# Patient Record
Sex: FEMALE | Race: WHITE | Employment: OTHER | URBAN - METROPOLITAN AREA
[De-identification: names, ages, dates, MRNs, and addresses within clinical notes are randomized per-mention and may not be internally consistent; named-entity substitution may affect disease eponyms.]

---

## 2017-01-23 ENCOUNTER — TELEPHONE (OUTPATIENT)
Dept: FAMILY MEDICINE CLINIC | Facility: CLINIC | Age: 67
End: 2017-01-23

## 2017-01-23 ENCOUNTER — NURSE ONLY (OUTPATIENT)
Dept: FAMILY MEDICINE CLINIC | Facility: CLINIC | Age: 67
End: 2017-01-23

## 2017-01-23 DIAGNOSIS — R30.0 DYSURIA: Primary | ICD-10-CM

## 2017-01-23 LAB
BILIRUBIN: NEGATIVE
GLUCOSE (URINE DIPSTICK): NEGATIVE MG/DL
KETONES (URINE DIPSTICK): NEGATIVE MG/DL
MULTISTIX LOT#: NORMAL NUMERIC
NITRITE, URINE: NEGATIVE
PH, URINE: 5 (ref 4.5–8)
PROTEIN (URINE DIPSTICK): NEGATIVE MG/DL
SPECIFIC GRAVITY: 1.02 (ref 1–1.03)
URINE-COLOR: YELLOW
UROBILINOGEN,SEMI-QN: 0.2 MG/DL (ref 0–1.9)

## 2017-01-23 PROCEDURE — 87077 CULTURE AEROBIC IDENTIFY: CPT | Performed by: FAMILY MEDICINE

## 2017-01-23 PROCEDURE — 87186 SC STD MICRODIL/AGAR DIL: CPT | Performed by: FAMILY MEDICINE

## 2017-01-23 PROCEDURE — 81003 URINALYSIS AUTO W/O SCOPE: CPT | Performed by: FAMILY MEDICINE

## 2017-01-23 PROCEDURE — 87086 URINE CULTURE/COLONY COUNT: CPT | Performed by: FAMILY MEDICINE

## 2017-01-23 RX ORDER — SULFAMETHOXAZOLE AND TRIMETHOPRIM 800; 160 MG/1; MG/1
1 TABLET ORAL 2 TIMES DAILY
Qty: 10 TABLET | Refills: 0 | Status: SHIPPED | OUTPATIENT
Start: 2017-01-23 | End: 2017-01-28

## 2017-01-23 NOTE — TELEPHONE ENCOUNTER
Spoke with the pt and she is having burning at the end of emptying her bladder  Yesterday she felt that she had to go often but not today  I asked her if she could come to the office and give a urine sample and she is agreeable-  Ok per DS    Appt schedule

## 2017-01-23 NOTE — PROGRESS NOTES
Urine dip suspicious- per Dr. Mami Stanley send for culture and start bacrtim DS 1 po bid x5 days    Pt advised  And she v/u    Updated allergy list

## 2017-01-26 ENCOUNTER — TELEPHONE (OUTPATIENT)
Dept: FAMILY MEDICINE CLINIC | Facility: CLINIC | Age: 67
End: 2017-01-26

## 2017-01-26 NOTE — TELEPHONE ENCOUNTER
Detailed message left for pt on cell (ok per consent) with instructions to call with questions/concerns.

## 2017-01-26 NOTE — TELEPHONE ENCOUNTER
----- Message from Mirian Gilbert DO sent at 1/26/2017  8:06 AM CST -----  Can notify Koko Tubbs her urine grew out  Bacteria sensitive to the ABX she is on, but its not the most sensitive.  If she finshes it and is still having Sx she needs to call we may need t

## 2017-03-21 ENCOUNTER — PATIENT OUTREACH (OUTPATIENT)
Dept: FAMILY MEDICINE CLINIC | Facility: CLINIC | Age: 67
End: 2017-03-21

## 2017-04-01 RX ORDER — BUDESONIDE AND FORMOTEROL FUMARATE DIHYDRATE 160; 4.5 UG/1; UG/1
AEROSOL RESPIRATORY (INHALATION)
Qty: 1 INHALER | Refills: 11 | Status: SHIPPED | OUTPATIENT
Start: 2017-04-01 | End: 2018-04-21

## 2017-04-01 NOTE — TELEPHONE ENCOUNTER
Symbicort:  Last refill: 1-4-2016 #1 inhaler with 11 refills  Last Visit: 9-    Ventolin:  Last refill:1-4-2016 #1 inhaler with 1 refill  Last visit: 9-    Next Visit: Future Appointments  Date Time Provider Jerald Lomeli   9/26/2017 1:00

## 2017-04-18 ENCOUNTER — OFFICE VISIT (OUTPATIENT)
Dept: FAMILY MEDICINE CLINIC | Facility: CLINIC | Age: 67
End: 2017-04-18

## 2017-04-18 VITALS
OXYGEN SATURATION: 98 % | BODY MASS INDEX: 30 KG/M2 | WEIGHT: 186.19 LBS | RESPIRATION RATE: 20 BRPM | HEART RATE: 68 BPM | TEMPERATURE: 98 F | SYSTOLIC BLOOD PRESSURE: 128 MMHG | DIASTOLIC BLOOD PRESSURE: 90 MMHG

## 2017-04-18 DIAGNOSIS — J20.9 BRONCHITIS WITH BRONCHOSPASM: Primary | ICD-10-CM

## 2017-04-18 PROCEDURE — 99214 OFFICE O/P EST MOD 30 MIN: CPT | Performed by: FAMILY MEDICINE

## 2017-04-18 RX ORDER — AZITHROMYCIN 250 MG/1
TABLET, FILM COATED ORAL
Qty: 6 TABLET | Refills: 0 | Status: SHIPPED | OUTPATIENT
Start: 2017-04-18 | End: 2017-09-26 | Stop reason: ALTCHOICE

## 2017-04-18 RX ORDER — PREDNISONE 20 MG/1
40 TABLET ORAL DAILY
Qty: 10 TABLET | Refills: 0 | Status: SHIPPED | OUTPATIENT
Start: 2017-04-18 | End: 2017-04-23

## 2017-04-18 NOTE — PROGRESS NOTES
Roshni Quinteros is a 79year old female. Patient presents with:  Chest Congestion: chest congestions for 2 weeks per pt    HPI:   Bonny Dakins presents to the office with complaints of upper respiratory tract infection, having congestion for 17 days.  Started wit MG Oral Cap Take 220 mg by mouth 2 (two) times daily as needed.  Disp:  Rfl:         Doxycycline             Nausea and vomiting  Penicillins             Diarrhea   Past Medical History   Diagnosis Date   • COPD (chronic obstructive pulmonary disease) with or worsen. Bronchitis with bronchospasm  (primary encounter diagnosis)    No orders of the defined types were placed in this encounter.        Meds & Refills for this Visit:  Signed Prescriptions Disp Refills    azithromycin 250 MG Oral Tab 6 tablet 0

## 2017-09-26 ENCOUNTER — OFFICE VISIT (OUTPATIENT)
Dept: FAMILY MEDICINE CLINIC | Facility: CLINIC | Age: 67
End: 2017-09-26

## 2017-09-26 VITALS
HEART RATE: 80 BPM | TEMPERATURE: 98 F | SYSTOLIC BLOOD PRESSURE: 140 MMHG | WEIGHT: 181.63 LBS | BODY MASS INDEX: 29.9 KG/M2 | RESPIRATION RATE: 14 BRPM | HEIGHT: 65.5 IN | DIASTOLIC BLOOD PRESSURE: 90 MMHG

## 2017-09-26 DIAGNOSIS — Z12.31 VISIT FOR SCREENING MAMMOGRAM: ICD-10-CM

## 2017-09-26 DIAGNOSIS — S92.332D CLOSED DISPLACED FRACTURE OF THIRD METATARSAL BONE OF LEFT FOOT WITH ROUTINE HEALING, SUBSEQUENT ENCOUNTER: ICD-10-CM

## 2017-09-26 DIAGNOSIS — Z13.31 DEPRESSION SCREENING: ICD-10-CM

## 2017-09-26 DIAGNOSIS — Z00.00 ENCOUNTER FOR ANNUAL HEALTH EXAMINATION: Primary | ICD-10-CM

## 2017-09-26 DIAGNOSIS — E55.9 VITAMIN D DEFICIENCY: ICD-10-CM

## 2017-09-26 DIAGNOSIS — Z12.11 COLON CANCER SCREENING: ICD-10-CM

## 2017-09-26 DIAGNOSIS — Z13.6 SCREENING FOR CARDIOVASCULAR CONDITION: ICD-10-CM

## 2017-09-26 DIAGNOSIS — J41.0 SIMPLE CHRONIC BRONCHITIS (HCC): ICD-10-CM

## 2017-09-26 DIAGNOSIS — K63.5 POLYP OF COLON, UNSPECIFIED PART OF COLON, UNSPECIFIED TYPE: ICD-10-CM

## 2017-09-26 DIAGNOSIS — Z11.59 NEED FOR HEPATITIS C SCREENING TEST: ICD-10-CM

## 2017-09-26 DIAGNOSIS — Z78.0 POSTMENOPAUSAL: ICD-10-CM

## 2017-09-26 PROCEDURE — 86803 HEPATITIS C AB TEST: CPT | Performed by: FAMILY MEDICINE

## 2017-09-26 PROCEDURE — 82306 VITAMIN D 25 HYDROXY: CPT | Performed by: FAMILY MEDICINE

## 2017-09-26 PROCEDURE — G0439 PPPS, SUBSEQ VISIT: HCPCS | Performed by: FAMILY MEDICINE

## 2017-09-26 PROCEDURE — G0444 DEPRESSION SCREEN ANNUAL: HCPCS | Performed by: FAMILY MEDICINE

## 2017-09-26 PROCEDURE — 85025 COMPLETE CBC W/AUTO DIFF WBC: CPT | Performed by: FAMILY MEDICINE

## 2017-09-26 PROCEDURE — 80053 COMPREHEN METABOLIC PANEL: CPT | Performed by: FAMILY MEDICINE

## 2017-09-26 PROCEDURE — 80061 LIPID PANEL: CPT | Performed by: FAMILY MEDICINE

## 2017-09-26 NOTE — PATIENT INSTRUCTIONS
Recommended Websites for Advanced Directives    [employer].no. org/publications/Documents/personal_dec. pdf  An information packet, including necessary form from the "[employer] website. http://www. [place]state. [place]/public/books/adv

## 2017-09-26 NOTE — PROGRESS NOTES
HPI:   Kathryn Reyes is a 79year old female who presents for a Medicare Subsequent Annual Wellness visit (Pt already had Initial Annual Wellness). Feeling pretty great overall.      Seeing Dr. Leona Correia podiatrist for a left 3rd metatarsa MG/3ML Inhalation Solution Take 3 mL by nebulization every 4 (four) hours as needed. Fexofenadine HCl (ALLEGRA) 180 MG Oral Tab Take 180 mg by mouth daily. Calcium Citrate-Vitamin D (CITRACAL + D OR) Take 1 tablet by mouth daily.    Cholecalciferol (VIT 181 lb 9.6 oz.     Medicare Hearing Assessment  (Required for AWV/SWV)    Hearing Screening    Time taken:  9/26/2017  1:04 PM  Screening Method:  Whisper Test  Whisper Test Result:  Pass          Visual Acuity  Right Eye Visual Acuity: Corrected Right Eye (primary encounter diagnosis)  --declines immunizations; encouraged her to reconsider; in addition urged her to f/u with GI--she is 6 yrs past the recommended repeat scope    Visit for screening mammogram  -gave order  Postmenopausal  -gave order for dexa Appropriate Exercise    How would you describe your daily physical activity?: Moderate    How would you describe your current health state?: Good    How do you maintain positive mental well-being?: Social Interaction;Puzzles;Games; Visiting Friends; Visiting Charting, test patient and document. Then, refresh your progress note to see your input here.   Cognitive Assessment     What day of the week is this?: Correct    What month is it?: Correct    What year is it?: Correct    Recall \"Ball\": Correct    Reca applicable     Immunizations (Update Immunization Activity if applicable)     Influenza  Covered Annually  Please get every year    Pneumococcal 13 (Prevnar)  Covered Once after 65 No vaccine history found Please get once after your 65th birthday    Simin Paul Spirometry Testing Annually Spirometry date:  No flowsheet data found.          Template: [facility] AMB MEDICARE ANNUAL ASSESSMENT FEMALE [[id number]]

## 2017-09-27 LAB
25-HYDROXYVITAMIN D (TOTAL): 35.9 NG/ML (ref 30–100)
ALBUMIN SERPL-MCNC: 3.7 G/DL (ref 3.5–4.8)
ALP LIVER SERPL-CCNC: 54 U/L (ref 55–142)
ALT SERPL-CCNC: 30 U/L (ref 14–54)
AST SERPL-CCNC: 20 U/L (ref 15–41)
BASOPHILS # BLD AUTO: 0.06 X10(3) UL (ref 0–0.1)
BASOPHILS NFR BLD AUTO: 0.8 %
BILIRUB SERPL-MCNC: 0.3 MG/DL (ref 0.1–2)
BUN BLD-MCNC: 17 MG/DL (ref 8–20)
CALCIUM BLD-MCNC: 9.2 MG/DL (ref 8.3–10.3)
CHLORIDE: 104 MMOL/L (ref 101–111)
CHOLEST SMN-MCNC: 211 MG/DL (ref ?–200)
CO2: 27 MMOL/L (ref 22–32)
CREAT BLD-MCNC: 0.79 MG/DL (ref 0.55–1.02)
EOSINOPHIL # BLD AUTO: 0.29 X10(3) UL (ref 0–0.3)
EOSINOPHIL NFR BLD AUTO: 3.6 %
ERYTHROCYTE [DISTWIDTH] IN BLOOD BY AUTOMATED COUNT: 13 % (ref 11.5–16)
GLUCOSE BLD-MCNC: 81 MG/DL (ref 70–99)
HCT VFR BLD AUTO: 41 % (ref 34–50)
HDLC SERPL-MCNC: 84 MG/DL (ref 45–?)
HDLC SERPL: 2.51 {RATIO} (ref ?–4.44)
HEPATITIS C VIRUS AB INTERPRETATION: NONREACTIVE
HGB BLD-MCNC: 13.9 G/DL (ref 12–16)
IMMATURE GRANULOCYTE COUNT: 0.02 X10(3) UL (ref 0–1)
IMMATURE GRANULOCYTE RATIO %: 0.3 %
LDLC SERPL CALC-MCNC: 115 MG/DL (ref ?–130)
LDLC SERPL-MCNC: 12 MG/DL (ref 5–40)
LYMPHOCYTES # BLD AUTO: 1.29 X10(3) UL (ref 0.9–4)
LYMPHOCYTES NFR BLD AUTO: 16.2 %
M PROTEIN MFR SERPL ELPH: 7 G/DL (ref 6.1–8.3)
MCH RBC QN AUTO: 32.1 PG (ref 27–33.2)
MCHC RBC AUTO-ENTMCNC: 33.9 G/DL (ref 31–37)
MCV RBC AUTO: 94.7 FL (ref 81–100)
MONOCYTES # BLD AUTO: 0.67 X10(3) UL (ref 0.1–0.6)
MONOCYTES NFR BLD AUTO: 8.4 %
NEUTROPHIL ABS PRELIM: 5.65 X10 (3) UL (ref 1.3–6.7)
NEUTROPHILS # BLD AUTO: 5.65 X10(3) UL (ref 1.3–6.7)
NEUTROPHILS NFR BLD AUTO: 70.7 %
NONHDLC SERPL-MCNC: 127 MG/DL (ref ?–130)
PLATELET # BLD AUTO: 293 10(3)UL (ref 150–450)
POTASSIUM SERPL-SCNC: 4 MMOL/L (ref 3.6–5.1)
RBC # BLD AUTO: 4.33 X10(6)UL (ref 3.8–5.1)
RED CELL DISTRIBUTION WIDTH-SD: 45.1 FL (ref 35.1–46.3)
SODIUM SERPL-SCNC: 139 MMOL/L (ref 136–144)
TRIGLYCERIDES: 62 MG/DL (ref ?–150)
WBC # BLD AUTO: 8 X10(3) UL (ref 4–13)

## 2017-10-10 ENCOUNTER — TELEPHONE (OUTPATIENT)
Dept: FAMILY MEDICINE CLINIC | Facility: CLINIC | Age: 67
End: 2017-10-10

## 2017-10-27 PROBLEM — M85.89 OSTEOPENIA OF MULTIPLE SITES: Status: ACTIVE | Noted: 2017-10-27

## 2017-11-06 ENCOUNTER — MED REC SCAN ONLY (OUTPATIENT)
Dept: FAMILY MEDICINE CLINIC | Facility: CLINIC | Age: 67
End: 2017-11-06

## 2017-11-13 RX ORDER — IPRATROPIUM BROMIDE AND ALBUTEROL SULFATE 2.5; .5 MG/3ML; MG/3ML
SOLUTION RESPIRATORY (INHALATION)
Qty: 270 ML | Refills: 0 | Status: SHIPPED | OUTPATIENT
Start: 2017-11-13 | End: 2019-03-01

## 2017-12-06 ENCOUNTER — TELEPHONE (OUTPATIENT)
Dept: FAMILY MEDICINE CLINIC | Facility: CLINIC | Age: 67
End: 2017-12-06

## 2017-12-06 ENCOUNTER — HOSPITAL ENCOUNTER (OUTPATIENT)
Dept: GENERAL RADIOLOGY | Age: 67
Discharge: HOME | End: 2017-12-06
Attending: FAMILY MEDICINE
Payer: MEDICARE

## 2017-12-06 ENCOUNTER — OFFICE VISIT (OUTPATIENT)
Dept: FAMILY MEDICINE CLINIC | Facility: CLINIC | Age: 67
End: 2017-12-06

## 2017-12-06 VITALS
HEART RATE: 82 BPM | TEMPERATURE: 98 F | WEIGHT: 181.81 LBS | BODY MASS INDEX: 30 KG/M2 | DIASTOLIC BLOOD PRESSURE: 90 MMHG | SYSTOLIC BLOOD PRESSURE: 140 MMHG | OXYGEN SATURATION: 99 %

## 2017-12-06 DIAGNOSIS — R06.00 DOE (DYSPNEA ON EXERTION): Primary | ICD-10-CM

## 2017-12-06 DIAGNOSIS — J41.0 SIMPLE CHRONIC BRONCHITIS (HCC): ICD-10-CM

## 2017-12-06 DIAGNOSIS — R94.31 ABNORMAL EKG: ICD-10-CM

## 2017-12-06 DIAGNOSIS — R06.00 DOE (DYSPNEA ON EXERTION): ICD-10-CM

## 2017-12-06 DIAGNOSIS — R03.0 ELEVATED BLOOD PRESSURE READING: ICD-10-CM

## 2017-12-06 PROCEDURE — 99214 OFFICE O/P EST MOD 30 MIN: CPT | Performed by: FAMILY MEDICINE

## 2017-12-06 PROCEDURE — 71020 XR CHEST PA + LAT CHEST (CPT=71020): CPT | Performed by: FAMILY MEDICINE

## 2017-12-06 PROCEDURE — 93000 ELECTROCARDIOGRAM COMPLETE: CPT | Performed by: FAMILY MEDICINE

## 2017-12-06 RX ORDER — HYDROCHLOROTHIAZIDE 12.5 MG/1
12.5 TABLET ORAL DAILY
Qty: 30 TABLET | Refills: 0 | Status: SHIPPED | OUTPATIENT
Start: 2017-12-06 | End: 2017-12-20

## 2017-12-06 NOTE — PROGRESS NOTES
Adeola Bhandari is a 79year old female. Patient presents with:  Breathing Problem    HPI:   Breann Otto presents to the office with complaints of upper respiratory tract infection, having congestion for 1-2 weeks.   She has had a cough and minimal sputum produc HCl (ALLEGRA) 180 MG Oral Tab Take 180 mg by mouth daily. Disp:  Rfl:    Calcium Citrate-Vitamin D (CITRACAL + D OR) Take 1 tablet by mouth daily. Disp:  Rfl:    Cholecalciferol (VITAMIN D) 1000 UNITS Oral Cap Take 5,000 Units by mouth.    Disp:  Rfl:    Na edema    ASSESSMENT AND PLAN:   Jona Beltran is a 79year old female who presents with chronic bronchitis, PERALTA. Monetta Elbert PLAN: start spiriva daily. use flonase daily also. The patient indicates understanding of these issues and agrees to the plan.   The pa

## 2017-12-06 NOTE — TELEPHONE ENCOUNTER
Pt would like to be seen today, She is starting to feel some chest congestion and SOB with activity.    Please return call to [phone]

## 2017-12-07 ENCOUNTER — TELEPHONE (OUTPATIENT)
Dept: FAMILY MEDICINE CLINIC | Facility: CLINIC | Age: 67
End: 2017-12-07

## 2017-12-07 RX ORDER — AZITHROMYCIN 250 MG/1
TABLET, FILM COATED ORAL
Qty: 6 TABLET | Refills: 0 | Status: SHIPPED | OUTPATIENT
Start: 2017-12-07 | End: 2017-12-20 | Stop reason: ALTCHOICE

## 2017-12-07 NOTE — TELEPHONE ENCOUNTER
She can hold off for now, follow up with Dr. Kendell Ewing in 2 weeks as scheduled. I'm glad the inhaler is working.

## 2017-12-07 NOTE — TELEPHONE ENCOUNTER
Pt states that inhaler that you started her on really  Helped- wants to know if she still needs to have stress test?    Future Appointments  Date Time Provider Jerald Lomeli   12/20/2017 10:00 AM Charlie Rutledge MD [facility]

## 2017-12-07 NOTE — TELEPHONE ENCOUNTER
Pt advised.     Future Appointments  Date Time Provider Jerald Lomeli   12/20/2017 10:00 AM Kemar Dumont MD [address], 12/07/17, 1:41 PM

## 2017-12-07 NOTE — TELEPHONE ENCOUNTER
----- Message from Kenton Kulkarni DO sent at 12/7/2017 10:04 AM CST -----  I still would like her to continue with our plan from yesterday, follow up in 2 weeks, stress test ordered.

## 2017-12-20 ENCOUNTER — OFFICE VISIT (OUTPATIENT)
Dept: FAMILY MEDICINE CLINIC | Facility: CLINIC | Age: 67
End: 2017-12-20

## 2017-12-20 VITALS
DIASTOLIC BLOOD PRESSURE: 70 MMHG | TEMPERATURE: 98 F | HEART RATE: 64 BPM | SYSTOLIC BLOOD PRESSURE: 110 MMHG | WEIGHT: 180 LBS | BODY MASS INDEX: 29.63 KG/M2 | RESPIRATION RATE: 16 BRPM | HEIGHT: 65.5 IN

## 2017-12-20 DIAGNOSIS — R93.89 ABNORMAL CXR: Primary | ICD-10-CM

## 2017-12-20 DIAGNOSIS — J41.0 SIMPLE CHRONIC BRONCHITIS (HCC): ICD-10-CM

## 2017-12-20 DIAGNOSIS — R49.0 HOARSE VOICE QUALITY: ICD-10-CM

## 2017-12-20 DIAGNOSIS — I10 ESSENTIAL HYPERTENSION: ICD-10-CM

## 2017-12-20 PROCEDURE — 99214 OFFICE O/P EST MOD 30 MIN: CPT | Performed by: FAMILY MEDICINE

## 2017-12-20 RX ORDER — PANTOPRAZOLE SODIUM 40 MG/1
40 TABLET, DELAYED RELEASE ORAL
Qty: 30 TABLET | Refills: 0 | Status: SHIPPED | OUTPATIENT
Start: 2017-12-20 | End: 2017-12-20 | Stop reason: CLARIF

## 2017-12-20 RX ORDER — HYDROCHLOROTHIAZIDE 12.5 MG/1
12.5 TABLET ORAL DAILY
Qty: 30 TABLET | Refills: 0 | Status: SHIPPED | OUTPATIENT
Start: 2017-12-20 | End: 2017-12-20 | Stop reason: CLARIF

## 2017-12-20 RX ORDER — HYDROCHLOROTHIAZIDE 12.5 MG/1
12.5 TABLET ORAL DAILY
Qty: 30 TABLET | Refills: 11 | Status: SHIPPED | OUTPATIENT
Start: 2017-12-20 | End: 2018-12-09

## 2017-12-20 RX ORDER — PANTOPRAZOLE SODIUM 40 MG/1
40 TABLET, DELAYED RELEASE ORAL EVERY EVENING
Qty: 30 TABLET | Refills: 0 | Status: SHIPPED | OUTPATIENT
Start: 2017-12-20 | End: 2018-01-15

## 2017-12-20 NOTE — PROGRESS NOTES
Caleb Cheek is a 79year old female.   HPI:   PT is here to f/u on her recent visit with Dr. Keesha Lutz in which she was diagnosed with chronic bronchitis and put on spiriva, had abnromal CXR with likely pneumonia and an abnormal EKG for which a stress cheryl mouth.   Disp:  Rfl:    Naproxen Sodium (ALEVE) 220 MG Oral Cap Take 220 mg by mouth 2 (two) times daily as needed. Disp:  Rfl:    VENTOLIN  (90 Base) MCG/ACT Inhalation Aero Soln INHALE 2 PUFFS INTO THE LUNGS EVERY 4 (FOUR) HOURS AS NEEDED.  Disp: 1 cyanosis, clubbing or edema    ASSESSMENT AND PLAN:   Diagnoses and all orders for this visit:  HTN  -recent new diagnosis, tolerating HCTZ well and BP well controlled, CPM and f/u in 6 months  Abnormal CXR  -repeat CXR 4-6 weeks from first abnormal, make

## 2017-12-31 PROBLEM — I10 ESSENTIAL HYPERTENSION: Status: ACTIVE | Noted: 2017-12-31

## 2018-01-15 RX ORDER — PANTOPRAZOLE SODIUM 40 MG/1
TABLET, DELAYED RELEASE ORAL
Qty: 30 TABLET | Refills: 5 | Status: SHIPPED | OUTPATIENT
Start: 2018-01-15 | End: 2019-09-30

## 2018-01-15 NOTE — TELEPHONE ENCOUNTER
Last refilled on 12/20/17 for # 30 with 0 rf. Last seen on 12/20/17. Future Appointments  Date Time Provider Jerald Yani   1/22/2018 10:30 AM [facility] XR RM1 Corrine Young        Thank you.

## 2018-01-22 ENCOUNTER — HOSPITAL ENCOUNTER (OUTPATIENT)
Dept: GENERAL RADIOLOGY | Age: 68
Discharge: HOME | End: 2018-01-22
Attending: FAMILY MEDICINE
Payer: MEDICARE

## 2018-01-22 DIAGNOSIS — R93.89 ABNORMAL CXR: ICD-10-CM

## 2018-01-22 PROCEDURE — 71046 X-RAY EXAM CHEST 2 VIEWS: CPT | Performed by: FAMILY MEDICINE

## 2018-01-25 ENCOUNTER — TELEPHONE (OUTPATIENT)
Dept: FAMILY MEDICINE CLINIC | Facility: CLINIC | Age: 68
End: 2018-01-25

## 2018-01-25 NOTE — TELEPHONE ENCOUNTER
Written by Newman Aase, MD on 1/22/2018  9:53 PM   Good news--the chest xray is looking better than the prior, though there is still one small area I'm not sure what to make of.     1 area that looked like pneumonia previously is gone/clear.       Anothe

## 2018-01-25 NOTE — TELEPHONE ENCOUNTER
Spoke with patient who states she did not see the [employer] message from Dr Deon Morgan. Results given to patient as written by Dr Deon Morgan. Patient asked if message can be resent so she can review and consider options.   Patient will let office know what she decides

## 2018-02-05 ENCOUNTER — TELEPHONE (OUTPATIENT)
Dept: FAMILY MEDICINE CLINIC | Facility: CLINIC | Age: 68
End: 2018-02-05

## 2018-02-05 DIAGNOSIS — R91.1 LESION OF LEFT LUNG: Primary | ICD-10-CM

## 2018-02-05 NOTE — TELEPHONE ENCOUNTER
Pt called, she has decided to have a CT scan of left lung done as suggested by Dr. Don Paz.   Please call pt at [phone] to discuss

## 2018-02-05 NOTE — TELEPHONE ENCOUNTER
Called the pt and advised that the order has been placed and gave her the number to central scheduling- she v/u

## 2018-02-10 ENCOUNTER — HOSPITAL ENCOUNTER (OUTPATIENT)
Dept: CT IMAGING | Facility: HOSPITAL | Age: 68
Discharge: HOME | End: 2018-02-10
Attending: FAMILY MEDICINE
Payer: MEDICARE

## 2018-02-10 DIAGNOSIS — R91.1 LESION OF LEFT LUNG: ICD-10-CM

## 2018-02-10 PROCEDURE — 71250 CT THORAX DX C-: CPT | Performed by: FAMILY MEDICINE

## 2018-02-13 ENCOUNTER — TELEPHONE (OUTPATIENT)
Dept: FAMILY MEDICINE CLINIC | Facility: CLINIC | Age: 68
End: 2018-02-13

## 2018-02-13 NOTE — TELEPHONE ENCOUNTER
Called the pt and advised of the test results and the recommendations.   She wants to know what we are classifying the nodule as - I advised that the radiologist just noted it as a nodule- advised that it could just be a little bit of tissue- she wants to s

## 2018-02-13 NOTE — TELEPHONE ENCOUNTER
----- Message from Zohreh Escudero DO sent at 2/12/2018  8:09 AM CST -----  Can notify Kassidy Fret her CT showed, the area Dr. Dev Orozco was concerned about is an old area of scarring in the left lung base, likely due to previous old infection or inflammation.  But noth

## 2018-02-13 NOTE — TELEPHONE ENCOUNTER
Reviewed CT results with pt, reassured it is unlikely anything will come of these nodules, but with smoking hx we ought to repeat in 1 year, pt agreeable recall put in computer.     D/w pt the plaque build up should probably be further looked at with an UFH

## 2018-04-16 NOTE — TELEPHONE ENCOUNTER
Last refilled on 1/18/18 for # 18g with 0 refills    Last seen on 12/20/17  Future Appointments  Date Time Provider Jerald Lomeli   5/9/2018 2:45 PM PF CT RM1 PF [place]        Thank you.

## 2018-04-21 RX ORDER — BUDESONIDE AND FORMOTEROL FUMARATE DIHYDRATE 160; 4.5 UG/1; UG/1
AEROSOL RESPIRATORY (INHALATION)
Qty: 1 INHALER | Refills: 10 | Status: SHIPPED | OUTPATIENT
Start: 2018-04-21 | End: 2019-04-27

## 2018-04-21 NOTE — TELEPHONE ENCOUNTER
LRF  4/1/17 #1 with 11 refills  LOV  12/20/17 Future Appointments  Date Time Provider Jerald Lomeli   5/9/2018 2:45 PM PF CT RM1 PF CT [place]

## 2018-05-09 ENCOUNTER — HOSPITAL ENCOUNTER (OUTPATIENT)
Dept: CT IMAGING | Age: 68
Discharge: HOME | End: 2018-05-09
Attending: FAMILY MEDICINE

## 2018-05-09 DIAGNOSIS — Z13.9 SPECIAL SCREENING: ICD-10-CM

## 2018-05-11 ENCOUNTER — TELEPHONE (OUTPATIENT)
Dept: FAMILY MEDICINE CLINIC | Facility: CLINIC | Age: 68
End: 2018-05-11

## 2018-05-11 NOTE — TELEPHONE ENCOUNTER
Pt called back and advised of the test resutls and the recommendations from Dr. Deon Morgan  She is agreeable  I gave her the phone number to [phone] [place] [employer] and 2 names of a couple doctors in the practice  Dr. Stacey Hathaway and Dr. John Clark    She states that the NP she saw

## 2018-05-11 NOTE — TELEPHONE ENCOUNTER
----- Message from Zohreh Condon MD sent at 5/11/2018  7:13 AM CDT -----  Laveda Anger news, calcium score shows minimal plaque build up, put her at overall low risk for significant heart dz.   The lungs show scaring, most likely d/t prior infections and/or due to

## 2018-06-13 ENCOUNTER — HOSPITAL ENCOUNTER (OUTPATIENT)
Dept: CARDIOLOGY CLINIC | Facility: HOSPITAL | Age: 68
Discharge: HOME | End: 2018-06-13
Attending: INTERNAL MEDICINE

## 2018-06-13 DIAGNOSIS — Z13.9 ENCOUNTER FOR SCREENING: ICD-10-CM

## 2018-06-18 ENCOUNTER — MED REC SCAN ONLY (OUTPATIENT)
Dept: FAMILY MEDICINE CLINIC | Facility: CLINIC | Age: 68
End: 2018-06-18

## 2018-06-25 ENCOUNTER — RT VISIT (OUTPATIENT)
Dept: RESPIRATORY THERAPY | Facility: HOSPITAL | Age: 68
End: 2018-06-25
Attending: INTERNAL MEDICINE
Payer: MEDICARE

## 2018-06-25 DIAGNOSIS — R06.00 DYSPNEA ON EXERTION: ICD-10-CM

## 2018-06-25 PROCEDURE — 94729 DIFFUSING CAPACITY: CPT

## 2018-06-25 PROCEDURE — 94060 EVALUATION OF WHEEZING: CPT

## 2018-06-25 PROCEDURE — 94726 PLETHYSMOGRAPHY LUNG VOLUMES: CPT

## 2018-06-25 NOTE — PROCEDURES
Spirometry and  flow volume loop are consistent with severeairway obstruction.   There was no change in spirometry after bronchodilator challenge   There is an  increase in the RV and TLC  suggesting  air trapping  and hyperinflation  due to  airway obstruc

## 2018-07-11 ENCOUNTER — MED REC SCAN ONLY (OUTPATIENT)
Dept: FAMILY MEDICINE CLINIC | Facility: CLINIC | Age: 68
End: 2018-07-11

## 2018-07-31 RX ORDER — ACYCLOVIR 50 MG/G
OINTMENT TOPICAL
Qty: 15 G | Refills: 0 | Status: SHIPPED | OUTPATIENT
Start: 2018-07-31 | End: 2019-07-11

## 2018-07-31 NOTE — TELEPHONE ENCOUNTER
LOV: 12/20/17   Last Refill: 9/21/16 #15g 1 RF    Future Appointments  Date Time Provider Jerald Lomeli   9/28/2018 1:00 PM Sheri Davis MD [facility] [place]

## 2018-09-28 ENCOUNTER — OFFICE VISIT (OUTPATIENT)
Dept: FAMILY MEDICINE CLINIC | Facility: CLINIC | Age: 68
End: 2018-09-28
Payer: MEDICARE

## 2018-09-28 VITALS
DIASTOLIC BLOOD PRESSURE: 82 MMHG | HEART RATE: 77 BPM | SYSTOLIC BLOOD PRESSURE: 124 MMHG | HEIGHT: 67 IN | WEIGHT: 180 LBS | RESPIRATION RATE: 16 BRPM | BODY MASS INDEX: 28.25 KG/M2 | TEMPERATURE: 98 F

## 2018-09-28 DIAGNOSIS — I10 ESSENTIAL HYPERTENSION: ICD-10-CM

## 2018-09-28 DIAGNOSIS — K21.9 GASTROESOPHAGEAL REFLUX DISEASE WITHOUT ESOPHAGITIS: ICD-10-CM

## 2018-09-28 DIAGNOSIS — J41.0 SIMPLE CHRONIC BRONCHITIS (HCC): ICD-10-CM

## 2018-09-28 DIAGNOSIS — Z13.6 ENCOUNTER FOR LIPID SCREENING FOR CARDIOVASCULAR DISEASE: ICD-10-CM

## 2018-09-28 DIAGNOSIS — E55.9 VITAMIN D DEFICIENCY: ICD-10-CM

## 2018-09-28 DIAGNOSIS — Z00.00 ENCOUNTER FOR ANNUAL HEALTH EXAMINATION: Primary | ICD-10-CM

## 2018-09-28 DIAGNOSIS — M85.89 OSTEOPENIA OF MULTIPLE SITES: ICD-10-CM

## 2018-09-28 DIAGNOSIS — Z13.220 ENCOUNTER FOR LIPID SCREENING FOR CARDIOVASCULAR DISEASE: ICD-10-CM

## 2018-09-28 PROCEDURE — 84443 ASSAY THYROID STIM HORMONE: CPT | Performed by: FAMILY MEDICINE

## 2018-09-28 PROCEDURE — 85025 COMPLETE CBC W/AUTO DIFF WBC: CPT | Performed by: FAMILY MEDICINE

## 2018-09-28 PROCEDURE — 36415 COLL VENOUS BLD VENIPUNCTURE: CPT | Performed by: FAMILY MEDICINE

## 2018-09-28 PROCEDURE — 80053 COMPREHEN METABOLIC PANEL: CPT | Performed by: FAMILY MEDICINE

## 2018-09-28 PROCEDURE — 80061 LIPID PANEL: CPT | Performed by: FAMILY MEDICINE

## 2018-09-28 PROCEDURE — 82306 VITAMIN D 25 HYDROXY: CPT | Performed by: FAMILY MEDICINE

## 2018-09-28 PROCEDURE — 81001 URINALYSIS AUTO W/SCOPE: CPT | Performed by: FAMILY MEDICINE

## 2018-09-28 PROCEDURE — G0439 PPPS, SUBSEQ VISIT: HCPCS | Performed by: FAMILY MEDICINE

## 2018-09-28 RX ORDER — UMECLIDINIUM 62.5 UG/1
1 AEROSOL, POWDER ORAL EVERY MORNING
Refills: 10 | COMMUNITY
Start: 2018-09-07 | End: 2019-06-28

## 2018-09-28 RX ORDER — ASCORBIC ACID 500 MG
1 TABLET ORAL EVERY MORNING
COMMUNITY

## 2018-09-28 RX ORDER — LATANOPROST 50 UG/ML
2 SOLUTION/ DROPS OPHTHALMIC NIGHTLY
Refills: 1 | COMMUNITY
Start: 2018-09-18

## 2018-09-28 NOTE — PATIENT INSTRUCTIONS
[facility]    [address]  [place], [address]    Or    [facility] (have multiple locations)  [address] [place], [address]  Phone: [phone]

## 2018-09-28 NOTE — PROGRESS NOTES
HPI:   Sarah Becker is a 76year old female who presents for a Medicare Subsequent Annual Wellness visit (Pt already had Initial Annual Wellness).     Patient  reminds me she saw pulmonologist and was diagnosed with \"only using 1/2 her lung function\ Tobacco Use      Smoking status: Former Smoker        Packs/day: 1.00        Years: 20.00        Pack years: 20        Types: Cigarettes        Quit date: 2013        Years since quittin.7      Smokeless tobacco: Never Used         CAGE Alcohol hydrochlorothiazide 12.5 MG Oral Tab Take 1 tablet (12.5 mg total) by mouth daily.    IPRATROPIUM-ALBUTEROL 0.5-2.5 (3) MG/3ML Inhalation Solution INHALE 1 VIAL VIA NEBULIZER EVERY 4 HOURS   Fexofenadine HCl (ALLEGRA) 180 MG Oral Tab Take 180 mg by mouth rhythm. Pulmonary/Chest: Effort normal and breath sounds normal.   Neurological: She is alert. Skin: Skin is warm. Psychiatric: She has a normal mood and affect.         Vaccination History     Immunization History   Administered Date(s) Administered patient indicates understanding of these issues and agrees to the plan. Reinforced healthy diet, lifestyle, and exercise. No Follow-up on file.      Marin Peck MD, 9/28/2018     General Health     In the past six months, have you lost more than 10 po Annually if high risk No results found for: CHLAMYDIA No flowsheet data found.     Screening Mammogram      Mammogram  Annually to 76, then as discussed Mammogram due on 10/27/2018 Update Health Maintenance if applicable     Immunizations (Update Immunizat

## 2018-12-07 NOTE — TELEPHONE ENCOUNTER
Last refilled on 7/22/18 for # 18g with 0 refills  Last OV 9/28/18  No future appointments. Thank you.

## 2018-12-10 RX ORDER — HYDROCHLOROTHIAZIDE 12.5 MG/1
TABLET ORAL
Qty: 30 TABLET | Refills: 11 | Status: SHIPPED | OUTPATIENT
Start: 2018-12-10 | End: 2019-12-27

## 2018-12-10 NOTE — TELEPHONE ENCOUNTER
Last refilled on 12/20/17 for # 30 with 11 refills  Last BUN 23, creatinine 1.00 on 9/28/18  Last OV 9/28/18, /82  No future appointments. Thank you.

## 2018-12-28 ENCOUNTER — OFFICE VISIT (OUTPATIENT)
Dept: FAMILY MEDICINE CLINIC | Facility: CLINIC | Age: 68
End: 2018-12-28
Payer: MEDICARE

## 2018-12-28 VITALS
WEIGHT: 177.13 LBS | OXYGEN SATURATION: 97 % | SYSTOLIC BLOOD PRESSURE: 128 MMHG | BODY MASS INDEX: 28 KG/M2 | DIASTOLIC BLOOD PRESSURE: 88 MMHG | HEART RATE: 101 BPM | TEMPERATURE: 98 F

## 2018-12-28 DIAGNOSIS — R05.9 COUGH: Primary | ICD-10-CM

## 2018-12-28 DIAGNOSIS — R09.81 SINUS CONGESTION: ICD-10-CM

## 2018-12-28 PROCEDURE — 99213 OFFICE O/P EST LOW 20 MIN: CPT | Performed by: FAMILY MEDICINE

## 2018-12-28 RX ORDER — LEVOFLOXACIN 500 MG/1
500 TABLET, FILM COATED ORAL DAILY
Qty: 7 TABLET | Refills: 0 | Status: SHIPPED | OUTPATIENT
Start: 2018-12-28 | End: 2019-01-04

## 2018-12-28 NOTE — PROGRESS NOTES
HPI:    Patient ID: Cindy Maxwell is a 76year old female. Patient presents with:  Cough  Sinus Problem      HPI  Patient is here for cough and sinus congestion for 3-4 days. States cough is productive of yellowish sputum.  States she has a h/o COPD, NEBULIZER EVERY 4 HOURS Disp: 270 mL Rfl: 0   Fexofenadine HCl (ALLEGRA) 180 MG Oral Tab Take 180 mg by mouth daily. Disp:  Rfl:    Calcium Citrate-Vitamin D (CITRACAL + D OR) Take 1 tablet by mouth daily.  Disp:  Rfl:    Cholecalciferol (VITAMIN D) 1000 UN Oropharynx is clear and moist. No oropharyngeal exudate, posterior oropharyngeal edema or posterior oropharyngeal erythema. Eyes: Right eye exhibits no discharge. Left eye exhibits no discharge. Neck: Normal range of motion.    Cardiovascular: Normal he

## 2019-02-01 ENCOUNTER — TELEPHONE (OUTPATIENT)
Dept: FAMILY MEDICINE CLINIC | Facility: CLINIC | Age: 69
End: 2019-02-01

## 2019-02-01 NOTE — TELEPHONE ENCOUNTER
Pt is due for recheck on her CT of chest for a follow up on lung nodules. Forward to Dr. Hailey Mims, can you please place order and we can call patient to let her know. Thanks.

## 2019-02-02 NOTE — TELEPHONE ENCOUNTER
Patient states that after she was first referred to pulm, she went for visit with Dr Rosmery Chapa. They did want her to do a 6 minute walking test, which she hasn't done yet.  She also states that they told her that someday she may need a lung transplant which s

## 2019-02-04 NOTE — TELEPHONE ENCOUNTER
Spoke with the pt and advised that Dr. Dana Gonzales would like the pulmonologist to make the decision on follow up for the CT scan- the pt v/u she states that she thought that Dr. Flavio Ivory had said something about follow up at some point

## 2019-03-02 NOTE — TELEPHONE ENCOUNTER
Last refill: 11- #270 with 0 refills  Last Visit: 9-  Next Visit: No future appointments. Forward to Dr. Edgar Du please advise on refills. Thanks.

## 2019-03-04 ENCOUNTER — HOSPITAL ENCOUNTER (OUTPATIENT)
Dept: GENERAL RADIOLOGY | Age: 69
Discharge: HOME | End: 2019-03-04
Attending: FAMILY MEDICINE
Payer: MEDICARE

## 2019-03-04 ENCOUNTER — OFFICE VISIT (OUTPATIENT)
Dept: FAMILY MEDICINE CLINIC | Facility: CLINIC | Age: 69
End: 2019-03-04
Payer: MEDICARE

## 2019-03-04 ENCOUNTER — TELEPHONE (OUTPATIENT)
Dept: FAMILY MEDICINE CLINIC | Facility: CLINIC | Age: 69
End: 2019-03-04

## 2019-03-04 ENCOUNTER — HOSPITAL ENCOUNTER (OUTPATIENT)
Dept: GENERAL RADIOLOGY | Age: 69
Discharge: HOME | End: 2019-03-04
Attending: INTERNAL MEDICINE
Payer: MEDICARE

## 2019-03-04 VITALS
HEIGHT: 65.5 IN | DIASTOLIC BLOOD PRESSURE: 84 MMHG | WEIGHT: 169 LBS | TEMPERATURE: 98 F | OXYGEN SATURATION: 98 % | BODY MASS INDEX: 27.82 KG/M2 | HEART RATE: 75 BPM | SYSTOLIC BLOOD PRESSURE: 122 MMHG

## 2019-03-04 DIAGNOSIS — R05.9 COUGH: ICD-10-CM

## 2019-03-04 DIAGNOSIS — R06.2 WHEEZING: ICD-10-CM

## 2019-03-04 DIAGNOSIS — R06.00 DYSPNEA ON EXERTION: ICD-10-CM

## 2019-03-04 DIAGNOSIS — J44.1 COPD EXACERBATION (HCC): ICD-10-CM

## 2019-03-04 DIAGNOSIS — J44.9 CHRONIC OBSTRUCTIVE PULMONARY DISEASE, UNSPECIFIED COPD TYPE (HCC): Primary | ICD-10-CM

## 2019-03-04 DIAGNOSIS — R05.9 COUGH: Primary | ICD-10-CM

## 2019-03-04 PROCEDURE — 99214 OFFICE O/P EST MOD 30 MIN: CPT | Performed by: FAMILY MEDICINE

## 2019-03-04 PROCEDURE — 71046 X-RAY EXAM CHEST 2 VIEWS: CPT | Performed by: INTERNAL MEDICINE

## 2019-03-04 PROCEDURE — 94640 AIRWAY INHALATION TREATMENT: CPT | Performed by: FAMILY MEDICINE

## 2019-03-04 RX ORDER — PREDNISONE 20 MG/1
TABLET ORAL
Qty: 11 TABLET | Refills: 0 | Status: SHIPPED | OUTPATIENT
Start: 2019-03-04 | End: 2019-09-30 | Stop reason: ALTCHOICE

## 2019-03-04 RX ORDER — CEFDINIR 300 MG/1
300 CAPSULE ORAL 2 TIMES DAILY
Qty: 14 CAPSULE | Refills: 0 | Status: SHIPPED | OUTPATIENT
Start: 2019-03-04 | End: 2019-03-11

## 2019-03-04 RX ORDER — IPRATROPIUM BROMIDE AND ALBUTEROL SULFATE 2.5; .5 MG/3ML; MG/3ML
SOLUTION RESPIRATORY (INHALATION)
Qty: 270 ML | Refills: 0 | Status: SHIPPED | OUTPATIENT
Start: 2019-03-04 | End: 2021-04-09

## 2019-03-04 RX ORDER — IPRATROPIUM BROMIDE AND ALBUTEROL SULFATE 2.5; .5 MG/3ML; MG/3ML
SOLUTION RESPIRATORY (INHALATION)
Qty: 270 ML | Refills: 0 | Status: SHIPPED | OUTPATIENT
Start: 2019-03-04 | End: 2019-03-04

## 2019-03-04 RX ORDER — IPRATROPIUM BROMIDE AND ALBUTEROL SULFATE 2.5; .5 MG/3ML; MG/3ML
3 SOLUTION RESPIRATORY (INHALATION) ONCE
Status: COMPLETED | OUTPATIENT
Start: 2019-03-04 | End: 2019-03-04

## 2019-03-04 RX ADMIN — IPRATROPIUM BROMIDE AND ALBUTEROL SULFATE 3 ML: 2.5; .5 SOLUTION RESPIRATORY (INHALATION) at 16:52:00

## 2019-03-04 NOTE — TELEPHONE ENCOUNTER
Pt called, wanted to be seen today. Congestion and crackling sound in her throat. Please call pt at [phone] to discuss. Should she go to [facility] or Urgent care? Nothing available tomorrow either.

## 2019-03-04 NOTE — TELEPHONE ENCOUNTER
Patient states feels like there is a puddle in her throat. Some production when she coughs--just starting to get color to it. Fevers, chills, night sweats? No  Denies sore throat. Does not have tonsils. Does have SOB. Starting to get hoarse today.   Isa Morton

## 2019-03-04 NOTE — TELEPHONE ENCOUNTER
Patient accepted the appointment time of 4:15 pm.  Future Appointments   Date Time Provider Jerald Lomeli   3/4/2019  4:00 PM Terra Cortés MD [facility] [place]

## 2019-03-04 NOTE — TELEPHONE ENCOUNTER
Received note from Nevaeh Woodard for dx code for COPD with Duone[employer]. OK per verbal [address] to resend this. This has been done.

## 2019-03-04 NOTE — PROGRESS NOTES
HPI:   Penni Apgar is a 76year old female who presents for upper respiratory symptoms for 7 days. Started with: mild runny nose, not too bad, but much worse past 4-5 days.     Now has: cough, tight chest, tired, hoarse voice, a little sob at rest, HCl (ALLEGRA) 180 MG Oral Tab Take 180 mg by mouth daily.  Disp:  Rfl:       Past Medical History:   Diagnosis Date   • COPD (chronic obstructive pulmonary disease) with chronic bronchitis ([facility] [address]       Past Surgical History:   Procedure Laterality Date   • N subjectively and objective; cont TID for 2-3 days, then can decrease to prn as feeling better; steroid and abx indicated; push fluids, run humidifier; The patient is asked to call if no better in 3-4 days or if worsening symptoms.   The patient indicates

## 2019-03-20 ENCOUNTER — MED REC SCAN ONLY (OUTPATIENT)
Dept: FAMILY MEDICINE CLINIC | Facility: CLINIC | Age: 69
End: 2019-03-20

## 2019-04-29 RX ORDER — BUDESONIDE AND FORMOTEROL FUMARATE DIHYDRATE 160; 4.5 UG/1; UG/1
AEROSOL RESPIRATORY (INHALATION)
Qty: 1 INHALER | Refills: 11 | Status: SHIPPED | OUTPATIENT
Start: 2019-04-29 | End: 2020-05-11

## 2019-06-28 RX ORDER — UMECLIDINIUM 62.5 UG/1
1 AEROSOL, POWDER ORAL EVERY MORNING
Qty: 1 EACH | Refills: 0 | Status: SHIPPED | OUTPATIENT
Start: 2019-06-28 | End: 2019-07-25

## 2019-06-28 NOTE — TELEPHONE ENCOUNTER
Last refill: 9/07/2018 (placed as historical)  Last Visit: 3/04/219   Next Visit:   Future Appointments   Date Time Provider Jerald Lomeli   9/30/2019  1:00 PM Keane Rubinstein, MD [facility] [place]         Forward to Dr. Dev Orozco please advise on refills.  Marizol Hood

## 2019-07-05 ENCOUNTER — MED REC SCAN ONLY (OUTPATIENT)
Dept: FAMILY MEDICINE CLINIC | Facility: CLINIC | Age: 69
End: 2019-07-05

## 2019-07-11 RX ORDER — ACYCLOVIR 50 MG/G
OINTMENT TOPICAL
Qty: 15 G | Refills: 0 | Status: SHIPPED | OUTPATIENT
Start: 2019-07-11 | End: 2020-08-05

## 2019-07-11 NOTE — TELEPHONE ENCOUNTER
Last refill on Acyclovir 15g with 0 refills on 7 31 2018.   Last OV on 3 4 2019   Upcoming appointment on 9 30 2019

## 2019-07-26 RX ORDER — UMECLIDINIUM 62.5 UG/1
1 AEROSOL, POWDER ORAL EVERY MORNING
Qty: 1 EACH | Refills: 11 | Status: SHIPPED | OUTPATIENT
Start: 2019-07-26 | End: 2020-07-02

## 2019-09-30 ENCOUNTER — OFFICE VISIT (OUTPATIENT)
Dept: FAMILY MEDICINE CLINIC | Facility: CLINIC | Age: 69
End: 2019-09-30
Payer: MEDICARE

## 2019-09-30 ENCOUNTER — HOSPITAL ENCOUNTER (OUTPATIENT)
Dept: MAMMOGRAPHY | Age: 69
Discharge: HOME | End: 2019-09-30
Attending: FAMILY MEDICINE
Payer: MEDICARE

## 2019-09-30 VITALS
BODY MASS INDEX: 29.69 KG/M2 | DIASTOLIC BLOOD PRESSURE: 80 MMHG | HEART RATE: 68 BPM | TEMPERATURE: 99 F | SYSTOLIC BLOOD PRESSURE: 124 MMHG | RESPIRATION RATE: 14 BRPM | WEIGHT: 180.38 LBS | HEIGHT: 65.5 IN

## 2019-09-30 DIAGNOSIS — Z12.31 VISIT FOR SCREENING MAMMOGRAM: ICD-10-CM

## 2019-09-30 DIAGNOSIS — J41.0 SIMPLE CHRONIC BRONCHITIS (HCC): ICD-10-CM

## 2019-09-30 DIAGNOSIS — Z23 FLU VACCINE NEED: ICD-10-CM

## 2019-09-30 DIAGNOSIS — Z78.0 POSTMENOPAUSAL: ICD-10-CM

## 2019-09-30 DIAGNOSIS — H40.2290 CHRONIC NARROW ANGLE GLAUCOMA: ICD-10-CM

## 2019-09-30 DIAGNOSIS — Z00.00 ENCOUNTER FOR ANNUAL HEALTH EXAMINATION: Primary | ICD-10-CM

## 2019-09-30 DIAGNOSIS — Z12.11 COLON CANCER SCREENING: ICD-10-CM

## 2019-09-30 DIAGNOSIS — I10 ESSENTIAL HYPERTENSION: ICD-10-CM

## 2019-09-30 DIAGNOSIS — K21.9 GASTROESOPHAGEAL REFLUX DISEASE WITHOUT ESOPHAGITIS: ICD-10-CM

## 2019-09-30 DIAGNOSIS — Z13.31 DEPRESSION SCREENING: ICD-10-CM

## 2019-09-30 DIAGNOSIS — E55.9 VITAMIN D DEFICIENCY: ICD-10-CM

## 2019-09-30 DIAGNOSIS — M85.89 OSTEOPENIA OF MULTIPLE SITES: ICD-10-CM

## 2019-09-30 LAB
ANION GAP SERPL CALC-SCNC: 6 MMOL/L (ref 0–18)
APPEARANCE: CLEAR
BUN BLD-MCNC: 19 MG/DL (ref 7–18)
BUN/CREAT SERPL: 20.4 (ref 10–20)
CALCIUM BLD-MCNC: 9.4 MG/DL (ref 8.5–10.1)
CHLORIDE SERPL-SCNC: 103 MMOL/L (ref 98–112)
CO2 SERPL-SCNC: 29 MMOL/L (ref 21–32)
CREAT BLD-MCNC: 0.93 MG/DL (ref 0.55–1.02)
GLUCOSE BLD-MCNC: 84 MG/DL (ref 70–99)
MULTISTIX LOT#: NORMAL NUMERIC
OSMOLALITY SERPL CALC.SUM OF ELEC: 287 MOSM/KG (ref 275–295)
PH, URINE: 5.5 (ref 4.5–8)
POTASSIUM SERPL-SCNC: 3.9 MMOL/L (ref 3.5–5.1)
SODIUM SERPL-SCNC: 138 MMOL/L (ref 136–145)
SPECIFIC GRAVITY: 1.02 (ref 1–1.03)
URINE-COLOR: YELLOW
UROBILINOGEN,SEMI-QN: 0.2 MG/DL (ref 0–1.9)
VIT D+METAB SERPL-MCNC: 38.6 NG/ML (ref 30–100)

## 2019-09-30 PROCEDURE — G0444 DEPRESSION SCREEN ANNUAL: HCPCS | Performed by: FAMILY MEDICINE

## 2019-09-30 PROCEDURE — G0439 PPPS, SUBSEQ VISIT: HCPCS | Performed by: FAMILY MEDICINE

## 2019-09-30 PROCEDURE — 81003 URINALYSIS AUTO W/O SCOPE: CPT | Performed by: FAMILY MEDICINE

## 2019-09-30 PROCEDURE — 80048 BASIC METABOLIC PNL TOTAL CA: CPT | Performed by: FAMILY MEDICINE

## 2019-09-30 PROCEDURE — 77067 SCR MAMMO BI INCL CAD: CPT | Performed by: FAMILY MEDICINE

## 2019-09-30 PROCEDURE — 82306 VITAMIN D 25 HYDROXY: CPT | Performed by: FAMILY MEDICINE

## 2019-09-30 NOTE — PROGRESS NOTES
HPI:   Axel Navarrete is a 71year old female who presents for a Medicare Subsequent Annual Wellness visit (Pt already had Initial Annual Wellness).      Patient notices since it rained a lot she gets winded a little more easily with exertion and alb ne Practice)  Abbi Friday, MD as PCP - [facility]    Patient Active Problem List:     Simple chronic bronchitis ([address]     Osteopenia of multiple sites     Essential hypertension     Gastroesophageal reflux disease without esophagitis     Vitamin D deficiency Citrate-Vitamin D (CITRACAL + D OR) Take 1 tablet by mouth daily. Cholecalciferol (VITAMIN D) 1000 UNITS Oral Cap Take 5,000 Units by mouth. Naproxen Sodium (ALEVE) 220 MG Oral Cap Take 220 mg by mouth 2 (two) times daily as needed.       MEDICAL INFO Right Eye Chart Acuity: 20/20     Left Eye Chart Acuity: 20/20   Both Eyes Visual Acuity: Corrected     Able To Tolerate Visual Acuity: Yes      General Appearance:  Alert, cooperative, no distress, appears stated age   Head:  Normocephalic, without obviou last time and slight fear of perf; agrees to FIT, kit given  -declines cbc, lipid, tsh this year  -     OCCULT BLOOD, FECAL, IMMUNOASSAY; Future  -     BASIC METABOLIC PANEL (8);  Future  -     URINALYSIS, AUTO, W/O SCOPE  -     VITAMIN D, 25-HYDROXY; Futur SCHEDULE Internal Lab or Procedure External Lab or Procedure   Diabetes Screening      HbgA1C   Annually No results found for: A1C No flowsheet data found.     Fasting Blood Sugar (FSB)Annually Glucose (mg/dL)   Date Value   09/28/2018 83          Cardiovas Hemophiliacs who received Factor VIII or IX concentrates   Clients of institutions for the mentally retarded   Persons who live in the same house as a HepB virus carrier   Homosexual men   Illicit injectable drug abusers     Tetanus Toxoid  Only covered

## 2019-10-07 ENCOUNTER — TELEPHONE (OUTPATIENT)
Dept: FAMILY MEDICINE CLINIC | Facility: CLINIC | Age: 69
End: 2019-10-07

## 2019-10-07 ENCOUNTER — MED REC SCAN ONLY (OUTPATIENT)
Dept: FAMILY MEDICINE CLINIC | Facility: CLINIC | Age: 69
End: 2019-10-07

## 2019-10-07 NOTE — TELEPHONE ENCOUNTER
Pt states she viewed the report via [facility] for her mammogram and was concerned because it said she had a left breast mass.  I advised radiology needs to get the previous images from [facility] before advising her on what to do next, whether she needs additiona

## 2019-10-09 RX ORDER — ALBUTEROL SULFATE 90 UG/1
2 AEROSOL, METERED RESPIRATORY (INHALATION) EVERY 6 HOURS PRN
Qty: 18 G | Refills: 0 | Status: SHIPPED | OUTPATIENT
Start: 2019-10-09 | End: 2019-12-26

## 2019-10-14 ENCOUNTER — NURSE ONLY (OUTPATIENT)
Dept: FAMILY MEDICINE CLINIC | Facility: CLINIC | Age: 69
End: 2019-10-14
Payer: MEDICARE

## 2019-10-14 DIAGNOSIS — Z00.00 ENCOUNTER FOR ANNUAL HEALTH EXAMINATION: ICD-10-CM

## 2019-10-14 DIAGNOSIS — Z12.11 COLON CANCER SCREENING: ICD-10-CM

## 2019-10-14 PROCEDURE — 82274 ASSAY TEST FOR BLOOD FECAL: CPT | Performed by: FAMILY MEDICINE

## 2019-10-30 ENCOUNTER — TELEPHONE (OUTPATIENT)
Dept: FAMILY MEDICINE CLINIC | Facility: CLINIC | Age: 69
End: 2019-10-30

## 2019-11-15 ENCOUNTER — HOSPITAL ENCOUNTER (OUTPATIENT)
Dept: BONE DENSITY | Age: 69
Discharge: HOME | End: 2019-11-15
Attending: FAMILY MEDICINE
Payer: MEDICARE

## 2019-11-15 DIAGNOSIS — Z78.0 POSTMENOPAUSAL: ICD-10-CM

## 2019-11-15 DIAGNOSIS — M85.89 OSTEOPENIA OF MULTIPLE SITES: ICD-10-CM

## 2019-11-15 PROCEDURE — 77080 DXA BONE DENSITY AXIAL: CPT | Performed by: FAMILY MEDICINE

## 2019-12-06 ENCOUNTER — LAB ENCOUNTER (OUTPATIENT)
Dept: LAB | Age: 69
End: 2019-12-06
Attending: OPHTHALMOLOGY
Payer: MEDICARE

## 2019-12-06 DIAGNOSIS — H53.2 DIPLOPIA: Primary | ICD-10-CM

## 2019-12-06 PROCEDURE — 84439 ASSAY OF FREE THYROXINE: CPT

## 2019-12-06 PROCEDURE — 86618 LYME DISEASE ANTIBODY: CPT

## 2019-12-06 PROCEDURE — 84443 ASSAY THYROID STIM HORMONE: CPT

## 2019-12-06 PROCEDURE — 84481 FREE ASSAY (FT-3): CPT

## 2019-12-06 PROCEDURE — 83516 IMMUNOASSAY NONANTIBODY: CPT

## 2019-12-26 NOTE — TELEPHONE ENCOUNTER
Last refill on Albuterol 18g with 0 refills on 1 09 2019   Last OV on 9 30 2019 - Annual Health Visit

## 2019-12-27 RX ORDER — HYDROCHLOROTHIAZIDE 12.5 MG/1
TABLET ORAL
Qty: 90 TABLET | Refills: 2 | Status: SHIPPED | OUTPATIENT
Start: 2019-12-27 | End: 2020-09-28

## 2019-12-27 RX ORDER — ALBUTEROL SULFATE 90 UG/1
2 AEROSOL, METERED RESPIRATORY (INHALATION) EVERY 6 HOURS PRN
Qty: 18 G | Refills: 0 | Status: SHIPPED | OUTPATIENT
Start: 2019-12-27 | End: 2020-05-26

## 2020-01-16 ENCOUNTER — MED REC SCAN ONLY (OUTPATIENT)
Dept: FAMILY MEDICINE CLINIC | Facility: CLINIC | Age: 70
End: 2020-01-16

## 2020-03-27 ENCOUNTER — TELEPHONE (OUTPATIENT)
Dept: FAMILY MEDICINE CLINIC | Facility: CLINIC | Age: 70
End: 2020-03-27

## 2020-03-27 NOTE — TELEPHONE ENCOUNTER
[employer] prior authorization for medication that was sent in this morning at 11am    Please call back Stephanie Villafana

## 2020-04-02 ENCOUNTER — TELEPHONE (OUTPATIENT)
Dept: FAMILY MEDICINE CLINIC | Facility: CLINIC | Age: 70
End: 2020-04-02

## 2020-04-02 NOTE — TELEPHONE ENCOUNTER
Pt called she has a medication that insurance will not cover(ACYCLOVIR 5 % External Ointment). Pt submitted a request on why she needs it but now needs to have the doctor send something stating why the medication is needed.  She did not get any paperwork f

## 2020-04-02 NOTE — TELEPHONE ENCOUNTER
Medication Quantity Refills Start End   ACYCLOVIR 5 % External Ointment 15 g 0 7/11/2019      Spoke to Iris Khanna. Patient picked up rx 7/14/19. Paid $45.     Spoke to patient.  She received letter in mail that medication is not going to be on formulary

## 2020-05-11 RX ORDER — BUDESONIDE AND FORMOTEROL FUMARATE DIHYDRATE 160; 4.5 UG/1; UG/1
AEROSOL RESPIRATORY (INHALATION)
Qty: 1 INHALER | Refills: 3 | Status: SHIPPED | OUTPATIENT
Start: 2020-05-11 | End: 2020-08-31

## 2020-05-11 NOTE — TELEPHONE ENCOUNTER
Asthma & COPD Medication Protocol Failed5/11 8:37 AM   Asthma Action Score greater than or equal to 20    Appointment in past 6 or next 3 months     AAP/ACT given in last 12 months     Last refill - 4/29/19 - #1 with 11 refills  Last ov - 9/23/19

## 2020-05-26 NOTE — TELEPHONE ENCOUNTER
Asthma & COPD Medication Protocol Failed5/25 1:52 PM  x Asthma Action Score greater than or equal to 20   x Appointment in past 6 or next 3 months    x AAP/ACT given in last 12 months     Routing to provider per protocol.     Last refilled on 12/27/19 for #

## 2020-07-02 RX ORDER — UMECLIDINIUM 62.5 UG/1
AEROSOL, POWDER ORAL
Qty: 90 EACH | Refills: 1 | Status: SHIPPED | OUTPATIENT
Start: 2020-07-02 | End: 2020-12-24

## 2020-07-02 NOTE — TELEPHONE ENCOUNTER
Pt failed refill protocol for the following reasons:        Last refill: 7/26/2019 #1 inhaler with 11 refills  Last appt: 9/30/2019  Next appt: No future appointments. Forward to Dr. Angelo Welch, please advise on refills. Thank you.

## 2020-07-14 ENCOUNTER — MED REC SCAN ONLY (OUTPATIENT)
Dept: FAMILY MEDICINE CLINIC | Facility: CLINIC | Age: 70
End: 2020-07-14

## 2020-08-04 NOTE — TELEPHONE ENCOUNTER
Last refill on Acyclovir ointment 15g with 0 refills on 7 11 2010  Last OV on 9 30 2020- Annual Health Exam    No future appointments scheduled

## 2020-08-05 RX ORDER — ACYCLOVIR 50 MG/G
OINTMENT TOPICAL
Qty: 15 G | Refills: 0 | Status: SHIPPED | OUTPATIENT
Start: 2020-08-05

## 2020-08-11 ENCOUNTER — TELEPHONE (OUTPATIENT)
Dept: FAMILY MEDICINE CLINIC | Facility: CLINIC | Age: 70
End: 2020-08-11

## 2020-08-31 ENCOUNTER — TELEPHONE (OUTPATIENT)
Dept: FAMILY MEDICINE CLINIC | Facility: CLINIC | Age: 70
End: 2020-08-31

## 2020-08-31 RX ORDER — BUDESONIDE AND FORMOTEROL FUMARATE DIHYDRATE 160; 4.5 UG/1; UG/1
AEROSOL RESPIRATORY (INHALATION)
Qty: 1 INHALER | Refills: 0 | Status: SHIPPED | OUTPATIENT
Start: 2020-08-31 | End: 2020-11-18

## 2020-09-08 NOTE — TELEPHONE ENCOUNTER
Left message for the pt that the insurance denied the ointment advised to call back if she wants to try the oral medication

## 2020-09-10 ENCOUNTER — TELEPHONE (OUTPATIENT)
Dept: FAMILY MEDICINE CLINIC | Facility: CLINIC | Age: 70
End: 2020-09-10

## 2020-09-10 NOTE — TELEPHONE ENCOUNTER
PT WANTED TO ADV  ANISA [facility] NURSE THAT PT WAS ABLE TO GET A DISCOUNT CARD FROM PHARMACY AND IS ABLE TO GET     ACYCLOVIR 5 % External Ointment    $28 A MONTH.     JUST FYI:

## 2020-09-28 RX ORDER — HYDROCHLOROTHIAZIDE 12.5 MG/1
TABLET ORAL
Qty: 90 TABLET | Refills: 0 | Status: SHIPPED | OUTPATIENT
Start: 2020-09-28 | End: 2020-12-18

## 2020-09-28 NOTE — TELEPHONE ENCOUNTER
Hypertension Medications Protocol [id number]/27/2020 07:42 AM   CMP or BMP in past 12 months Protocol Details    Last serum creatinine< 2.0           Refilled per protocol.

## 2020-09-30 NOTE — TELEPHONE ENCOUNTER
Asthma & COPD Medication Protocol [id number]/30/2020 08:58 AM   Asthma Action Score greater than or equal to 20    AAP/ACT given in last 12 months    Appointment in past 6 or next 3 months      Routing to provider per protocol.     Last refilled on 5/26/20 fo

## 2020-10-12 ENCOUNTER — OFFICE VISIT (OUTPATIENT)
Dept: FAMILY MEDICINE CLINIC | Facility: CLINIC | Age: 70
End: 2020-10-12
Payer: MEDICARE

## 2020-10-12 ENCOUNTER — HOSPITAL ENCOUNTER (OUTPATIENT)
Dept: MAMMOGRAPHY | Age: 70
Discharge: HOME | End: 2020-10-12
Attending: FAMILY MEDICINE
Payer: MEDICARE

## 2020-10-12 ENCOUNTER — LABORATORY ENCOUNTER (OUTPATIENT)
Dept: LAB | Age: 70
End: 2020-10-12
Attending: FAMILY MEDICINE
Payer: MEDICARE

## 2020-10-12 VITALS
HEART RATE: 72 BPM | BODY MASS INDEX: 29.41 KG/M2 | DIASTOLIC BLOOD PRESSURE: 88 MMHG | WEIGHT: 183 LBS | HEIGHT: 66 IN | TEMPERATURE: 97 F | SYSTOLIC BLOOD PRESSURE: 136 MMHG | RESPIRATION RATE: 16 BRPM

## 2020-10-12 DIAGNOSIS — I10 ESSENTIAL HYPERTENSION: ICD-10-CM

## 2020-10-12 DIAGNOSIS — Z12.31 VISIT FOR SCREENING MAMMOGRAM: ICD-10-CM

## 2020-10-12 DIAGNOSIS — K21.9 GASTROESOPHAGEAL REFLUX DISEASE WITHOUT ESOPHAGITIS: ICD-10-CM

## 2020-10-12 DIAGNOSIS — R93.1 AGATSTON CORONARY ARTERY CALCIUM SCORE LESS THAN 100: ICD-10-CM

## 2020-10-12 DIAGNOSIS — Z12.39 SCREENING BREAST EXAMINATION: ICD-10-CM

## 2020-10-12 DIAGNOSIS — Z12.4 CERVICAL CANCER SCREENING: ICD-10-CM

## 2020-10-12 DIAGNOSIS — M85.89 OSTEOPENIA OF MULTIPLE SITES: ICD-10-CM

## 2020-10-12 DIAGNOSIS — Z12.11 SCREENING FOR COLON CANCER: ICD-10-CM

## 2020-10-12 DIAGNOSIS — E55.9 VITAMIN D DEFICIENCY: ICD-10-CM

## 2020-10-12 DIAGNOSIS — Z23 FLU VACCINE NEED: ICD-10-CM

## 2020-10-12 DIAGNOSIS — R73.9 ELEVATED BLOOD SUGAR: ICD-10-CM

## 2020-10-12 DIAGNOSIS — J41.0 SIMPLE CHRONIC BRONCHITIS (HCC): ICD-10-CM

## 2020-10-12 DIAGNOSIS — Z00.00 ENCOUNTER FOR ANNUAL HEALTH EXAMINATION: ICD-10-CM

## 2020-10-12 DIAGNOSIS — Z00.00 ENCOUNTER FOR ANNUAL HEALTH EXAMINATION: Primary | ICD-10-CM

## 2020-10-12 DIAGNOSIS — H40.2290 CHRONIC NARROW ANGLE GLAUCOMA: ICD-10-CM

## 2020-10-12 PROCEDURE — 87624 HPV HI-RISK TYP POOLED RSLT: CPT | Performed by: FAMILY MEDICINE

## 2020-10-12 PROCEDURE — 80061 LIPID PANEL: CPT

## 2020-10-12 PROCEDURE — 80053 COMPREHEN METABOLIC PANEL: CPT

## 2020-10-12 PROCEDURE — G0439 PPPS, SUBSEQ VISIT: HCPCS | Performed by: FAMILY MEDICINE

## 2020-10-12 PROCEDURE — 83036 HEMOGLOBIN GLYCOSYLATED A1C: CPT

## 2020-10-12 PROCEDURE — 36415 COLL VENOUS BLD VENIPUNCTURE: CPT

## 2020-10-12 PROCEDURE — 82306 VITAMIN D 25 HYDROXY: CPT

## 2020-10-12 PROCEDURE — 77067 SCR MAMMO BI INCL CAD: CPT | Performed by: FAMILY MEDICINE

## 2020-10-12 PROCEDURE — 84443 ASSAY THYROID STIM HORMONE: CPT

## 2020-10-12 PROCEDURE — 85025 COMPLETE CBC W/AUTO DIFF WBC: CPT

## 2020-10-12 PROCEDURE — 88175 CYTOPATH C/V AUTO FLUID REDO: CPT | Performed by: FAMILY MEDICINE

## 2020-10-12 NOTE — PATIENT INSTRUCTIONS
Jewels Galvin's SCREENING SCHEDULE   Tests on this list are recommended by your physician but may not be covered, or covered at this frequency, by your insurer. Please check with your insurance carrier before scheduling to verify coverage.    VENKAT if abnormal Colonoscopy due on 10/14/2020 Update [facility] if applicable    Flex Sigmoidoscopy Screen  Covered every 5 years No results found for this or any previous visit. No flowsheet data found.      Fecal Occult Blood   Covered Annually Occult Please get every year    Pneumococcal 13 (Prevnar)  Covered Once after 65 No orders found for this or any previous visit.  Please get once after your 65th birthday    Pneumococcal 23 (Pneumovax)  Covered Once after 65 No orders found for this or any previou

## 2020-10-12 NOTE — PROGRESS NOTES
HPI:   Adele Wilcox is a 79year old female who presents for a Medicare Subsequent Annual Wellness visit (Pt already had Initial Annual Wellness). Patient Active Problem List:     Simple chronic bronchitis (HCC)-doesn't see pulm.  Rarely needs her Used         CAGE Alcohol screening   Michelle Mirza was screened for Alcohol abuse and had a score of 0 so is at low risk.     Patient Care Team: Patient Care Team:  Fernando Brown MD as PCP - General (Family Practice)  Zaheer Maldonado MD as PCP - [facility] Solution, Place 2 drops into both eyes nightly. •  Vitamin C 500 MG Oral Tab, Take 1 tablet by mouth every morning. •  Fexofenadine HCl (ALLEGRA) 180 MG Oral Tab, Take 180 mg by mouth daily.     •  Calcium Citrate-Vitamin D (CITRACAL + D OR), Take 1 t following:    Height as of this encounter: 66\". Weight as of this encounter: 183 lb (83 kg).     Medicare Hearing Assessment  (Required for AWV/SWV)    Hears whispered voice        Visual Acuity  Right Eye Visual Acuity: Corrected Right Eye Chart Acuity uterus normal size, shape, and consistency and vagina normal without discharge    Vaccination History     Immunization History   Administered Date(s) Administered   • Fluvirin, 3 Years & >, Im 10/29/2011   • Pneumococcal (Prevnar 13) 02/06/2019   • Pneumov PLATELET; Future  -     COMP METABOLIC PANEL (14); Future  -     LIPID PANEL;  Future  -     ASSAY, THYROID STIM HORMONE; Future  -     VITAMIN D, 25-HYDROXY; Future  -     HEMOGLOBIN A1C; Future  -     VENIPUNCTURE    Gastroesophageal reflux disease withou energy level?: (P) Other  How would you describe your daily physical activity?: (P) Moderate  How would you describe your current health state?: (P) Good  How do you maintain positive mental well-being?: (P) Social Interaction; Visiting Family      This sec Mammogram due on 09/30/2021 Update Health Maintenance if applicable     Immunizations (Update Immunization Activity if applicable)     Influenza  Covered Annually 10/29/2011 Please get every year    Pneumococcal 13 (Prevnar)  Covered Once after 65 02/06/20

## 2020-10-14 ENCOUNTER — TELEPHONE (OUTPATIENT)
Dept: FAMILY MEDICINE CLINIC | Facility: CLINIC | Age: 70
End: 2020-10-14

## 2020-10-14 NOTE — TELEPHONE ENCOUNTER
Pt called, pt just seen this past Monday, Were we suppose to give pt a stool test kit or is one being mailed to her?   Please call pt at [phone]

## 2020-10-14 NOTE — TELEPHONE ENCOUNTER
Left message for the pt that the fit test was put in the mail yesterday and apologized that she did not get it on Monday when she was here    Advised in message that once she completes that test to bring it back to the office and to call if questions

## 2020-10-17 ENCOUNTER — TELEPHONE (OUTPATIENT)
Dept: FAMILY MEDICINE CLINIC | Facility: CLINIC | Age: 70
End: 2020-10-17

## 2020-10-17 NOTE — TELEPHONE ENCOUNTER
----- Message from oDug Bradford MD sent at 10/17/2020  9:52 AM CDT -----  Please notify labs look beautiful overall including cholesterol, kidnesy, liver, electrolytes, thyroid, vitamin D, blood counts.   Blood sugar just BARELY elevated into the prediabet

## 2020-10-27 ENCOUNTER — MED REC SCAN ONLY (OUTPATIENT)
Dept: FAMILY MEDICINE CLINIC | Facility: CLINIC | Age: 70
End: 2020-10-27

## 2020-11-18 RX ORDER — BUDESONIDE AND FORMOTEROL FUMARATE DIHYDRATE 160; 4.5 UG/1; UG/1
2 AEROSOL RESPIRATORY (INHALATION) 2 TIMES DAILY
Qty: 3 INHALER | Refills: 1 | Status: SHIPPED | OUTPATIENT
Start: 2020-11-18 | End: 2021-05-18

## 2020-11-18 NOTE — TELEPHONE ENCOUNTER
Last refilled 8/31/20 for 1 inhaler with 0 RF  LOV with [address] 10/12/20  No future appt  Medication failed protocol:  Routed to PCP to advise refill    Asthma & COPD Medication Protocol Znlyrl1211/18/2020 08:24 AM   Asthma Action Score greater than or equal to 2

## 2020-12-09 NOTE — TELEPHONE ENCOUNTER
Appointment in past 6 or next 3 months      Last OV on 10 12 2020- Annual wellness exam.  Refilled per protocol

## 2020-12-18 RX ORDER — HYDROCHLOROTHIAZIDE 12.5 MG/1
TABLET ORAL
Qty: 90 TABLET | Refills: 0 | Status: SHIPPED | OUTPATIENT
Start: 2020-12-18 | End: 2021-03-22

## 2020-12-18 NOTE — TELEPHONE ENCOUNTER
Last refilled 9/28/20 #90 with 0 RF  LOV with [address] 10/12/20  No future appt  Medication passed protocol:  Last CMP 10/12/20    Hypertension Medications Protocol Qwbtsn6312/18/2020 09:08 AM   CMP or BMP in past 12 months Protocol Details    Last serum creatinin

## 2020-12-24 RX ORDER — UMECLIDINIUM 62.5 UG/1
1 AEROSOL, POWDER ORAL EVERY MORNING
Qty: 90 EACH | Refills: 1 | Status: SHIPPED | OUTPATIENT
Start: 2020-12-24 | End: 2021-07-20

## 2021-03-13 DIAGNOSIS — Z23 NEED FOR VACCINATION: ICD-10-CM

## 2021-03-22 RX ORDER — HYDROCHLOROTHIAZIDE 12.5 MG/1
TABLET ORAL
Qty: 90 TABLET | Refills: 0 | Status: SHIPPED | OUTPATIENT
Start: 2021-03-22 | End: 2021-06-14

## 2021-03-22 NOTE — TELEPHONE ENCOUNTER
Hypertension Medications Protocol Ipyvsr5203/22/2021 10:41 AM   CMP or BMP in past 12 months Protocol Details    Last serum creatinine< 2.0     Appointment in past 6 or next 3 months      Refilled per protocol.

## 2021-04-06 ENCOUNTER — MED REC SCAN ONLY (OUTPATIENT)
Dept: FAMILY MEDICINE CLINIC | Facility: CLINIC | Age: 71
End: 2021-04-06

## 2021-04-09 DIAGNOSIS — J44.9 CHRONIC OBSTRUCTIVE PULMONARY DISEASE, UNSPECIFIED COPD TYPE (HCC): ICD-10-CM

## 2021-04-09 RX ORDER — IPRATROPIUM BROMIDE AND ALBUTEROL SULFATE 2.5; .5 MG/3ML; MG/3ML
SOLUTION RESPIRATORY (INHALATION)
Qty: 270 ML | Refills: 0 | Status: SHIPPED | OUTPATIENT
Start: 2021-04-09

## 2021-04-09 NOTE — TELEPHONE ENCOUNTER
Last refill on ipratropium #270 mll with 0  Refills on 3 4 2019. Last OV on 10 12 2020-  Annual Wellness.   Refilled per protocol

## 2021-05-18 RX ORDER — BUDESONIDE AND FORMOTEROL FUMARATE DIHYDRATE 160; 4.5 UG/1; UG/1
2 AEROSOL RESPIRATORY (INHALATION) 2 TIMES DAILY
Qty: 1 INHALER | Refills: 5 | Status: SHIPPED | OUTPATIENT
Start: 2021-05-18 | End: 2021-10-27

## 2021-05-18 NOTE — TELEPHONE ENCOUNTER
Asthma & COPD Medication Protocol Specsq6805/18/2021 04:42 AM   Asthma Action Score greater than or equal to 20    Appointment in past 6 or next 3 months     AAP/ACT given in last 12 months     Routing to provider per protocol.     Last refilled on 11/18/20 f

## 2021-06-14 RX ORDER — HYDROCHLOROTHIAZIDE 12.5 MG/1
TABLET ORAL
Qty: 90 TABLET | Refills: 0 | Status: SHIPPED | OUTPATIENT
Start: 2021-06-14 | End: 2021-09-10

## 2021-06-14 NOTE — TELEPHONE ENCOUNTER
Hypertension Medications Protocol Bigbog6206/14/2021 03:36 AM   Appointment in past 6 or next 3 months Protocol Details    CMP or BMP in past 12 months     Last serum creatinine< 2.0        LOV 10/12/20  Labs done 10/12/20  No future appointments.

## 2021-07-20 RX ORDER — UMECLIDINIUM 62.5 UG/1
1 AEROSOL, POWDER ORAL EVERY MORNING
Qty: 180 EACH | Refills: 1 | Status: SHIPPED | OUTPATIENT
Start: 2021-07-20 | End: 2022-01-16

## 2021-07-20 NOTE — TELEPHONE ENCOUNTER
Asthma & COPD Medication Protocol Ttcnqp3707/20/2021 09:31 AM   Asthma Action Score greater than or equal to 20    Appointment in past 6 or next 3 months     AAP/ACT given in last 12 months     Last refilled on 12/24/2020 for # 90 each with 1 rf.    Last seen

## 2021-07-21 ENCOUNTER — TELEPHONE (OUTPATIENT)
Dept: FAMILY MEDICINE CLINIC | Facility: CLINIC | Age: 71
End: 2021-07-21

## 2021-07-21 NOTE — TELEPHONE ENCOUNTER
LM for patient that her Ellipta has been approved and she may get it filled. Any questions call office.   TY.

## 2021-09-10 ENCOUNTER — TELEPHONE (OUTPATIENT)
Dept: FAMILY MEDICINE CLINIC | Facility: CLINIC | Age: 71
End: 2021-09-10

## 2021-09-10 RX ORDER — HYDROCHLOROTHIAZIDE 12.5 MG/1
12.5 TABLET ORAL DAILY
Qty: 90 TABLET | Refills: 0 | Status: SHIPPED | OUTPATIENT
Start: 2021-09-10 | End: 2021-12-16

## 2021-09-10 NOTE — TELEPHONE ENCOUNTER
Last refilled on 06/14/2021 for # 90 with 0 rf. Last labs 10/12/2020. Last seen on 10/12/2020. Thank you.

## 2021-10-14 ENCOUNTER — TELEPHONE (OUTPATIENT)
Dept: FAMILY MEDICINE CLINIC | Facility: CLINIC | Age: 71
End: 2021-10-14

## 2021-10-14 RX ORDER — ALBUTEROL SULFATE 90 UG/1
2 AEROSOL, METERED RESPIRATORY (INHALATION) EVERY 6 HOURS PRN
Qty: 18 G | Refills: 0 | Status: SHIPPED | OUTPATIENT
Start: 2021-10-14

## 2021-10-14 NOTE — TELEPHONE ENCOUNTER
Patient called requesting a refill for:  VENTOLIN  (90 Base) MCG/ACT Inhalation Aero Soln 18 g     [id number] Mele Ziegler, [address] [phone], [phone]    Please advise # [phone]

## 2021-10-14 NOTE — TELEPHONE ENCOUNTER
Last OV 10/12/2020 Sharad Bojorquez)  Future Appointments   Date Time Provider Jerald Lomeli   10/26/2021 10:30 AM DO MALLORY Au [facility] Venecia Tay      last refilled 12/9/2020  18g  1 refill

## 2021-10-26 ENCOUNTER — OFFICE VISIT (OUTPATIENT)
Dept: FAMILY MEDICINE CLINIC | Facility: CLINIC | Age: 71
End: 2021-10-26
Payer: MEDICARE

## 2021-10-26 ENCOUNTER — HOSPITAL ENCOUNTER (OUTPATIENT)
Dept: MAMMOGRAPHY | Age: 71
Discharge: HOME | End: 2021-10-26
Attending: FAMILY MEDICINE
Payer: MEDICARE

## 2021-10-26 VITALS
HEIGHT: 66.5 IN | WEIGHT: 183 LBS | DIASTOLIC BLOOD PRESSURE: 80 MMHG | BODY MASS INDEX: 29.06 KG/M2 | SYSTOLIC BLOOD PRESSURE: 150 MMHG | RESPIRATION RATE: 16 BRPM | HEART RATE: 81 BPM | TEMPERATURE: 97 F | OXYGEN SATURATION: 97 %

## 2021-10-26 DIAGNOSIS — M85.89 OSTEOPENIA OF MULTIPLE SITES: ICD-10-CM

## 2021-10-26 DIAGNOSIS — J44.9 CHRONIC OBSTRUCTIVE PULMONARY DISEASE, UNSPECIFIED COPD TYPE (HCC): ICD-10-CM

## 2021-10-26 DIAGNOSIS — R73.9 ELEVATED BLOOD SUGAR: ICD-10-CM

## 2021-10-26 DIAGNOSIS — E55.9 VITAMIN D DEFICIENCY: ICD-10-CM

## 2021-10-26 DIAGNOSIS — I10 ESSENTIAL HYPERTENSION: ICD-10-CM

## 2021-10-26 DIAGNOSIS — H40.2290 CHRONIC NARROW ANGLE GLAUCOMA: ICD-10-CM

## 2021-10-26 DIAGNOSIS — R93.1 AGATSTON CORONARY ARTERY CALCIUM SCORE LESS THAN 100: ICD-10-CM

## 2021-10-26 DIAGNOSIS — Z12.11 SCREENING FOR COLON CANCER: ICD-10-CM

## 2021-10-26 DIAGNOSIS — Z12.31 ENCOUNTER FOR SCREENING MAMMOGRAM FOR MALIGNANT NEOPLASM OF BREAST: ICD-10-CM

## 2021-10-26 DIAGNOSIS — J41.0 SIMPLE CHRONIC BRONCHITIS (HCC): ICD-10-CM

## 2021-10-26 DIAGNOSIS — Z00.00 ENCOUNTER FOR ANNUAL HEALTH EXAMINATION: Primary | ICD-10-CM

## 2021-10-26 DIAGNOSIS — K21.9 GASTROESOPHAGEAL REFLUX DISEASE WITHOUT ESOPHAGITIS: ICD-10-CM

## 2021-10-26 PROCEDURE — 77067 SCR MAMMO BI INCL CAD: CPT | Performed by: FAMILY MEDICINE

## 2021-10-26 PROCEDURE — 83036 HEMOGLOBIN GLYCOSYLATED A1C: CPT | Performed by: FAMILY MEDICINE

## 2021-10-26 PROCEDURE — 80061 LIPID PANEL: CPT | Performed by: FAMILY MEDICINE

## 2021-10-26 PROCEDURE — 85025 COMPLETE CBC W/AUTO DIFF WBC: CPT | Performed by: FAMILY MEDICINE

## 2021-10-26 PROCEDURE — 82306 VITAMIN D 25 HYDROXY: CPT | Performed by: FAMILY MEDICINE

## 2021-10-26 PROCEDURE — G0439 PPPS, SUBSEQ VISIT: HCPCS | Performed by: FAMILY MEDICINE

## 2021-10-26 PROCEDURE — 80053 COMPREHEN METABOLIC PANEL: CPT | Performed by: FAMILY MEDICINE

## 2021-10-26 NOTE — PATIENT INSTRUCTIONS
Shaheen Galvin's SCREENING SCHEDULE   Tests on this list are recommended by your physician but may not be covered, or covered at this frequency, by your insurer. Please check with your insurance carrier before scheduling to verify coverage.    PREVENT (CPT=77080) 11/15/2019      No recommendations at this time   Pap and Pelvic    Pap   Covered every 2 years for women at normal risk;  Annually if at high risk 10/12/2020  Pap Smear,5 Years due on 10/12/2025    Chlamydia Annually if high risk -  No recommen necessary form from the [facility]. http://[email or web address] idph.[email or web address] [place]/public/books/advin.htm  A link to the ScaleDB.  This site has a lot of good information including definitions of the different types of Ad

## 2021-10-26 NOTE — PROGRESS NOTES
HPI:   Renzo Castanon is a 70year old female who presents for a Medicare Subsequent Annual Wellness visit (Pt already had Initial Annual Wellness). Chronic bronchitis - on inhalers. Doing okay overall.  Nebulizer helps more than the inhaler, needs dominik chronic bronchitis (HCC)     Osteopenia of multiple sites     Essential hypertension     Gastroesophageal reflux disease without esophagitis     Vitamin D deficiency     Chronic narrow angle glaucoma    Wt Readings from Last 3 Encounters:  10/26/21 : 183 l daily.  Cholecalciferol (VITAMIN D) 1000 UNITS Oral Cap, Take 5,000 Units by mouth. Naproxen Sodium 220 MG Oral Cap, Take 220 mg by mouth 2 (two) times daily as needed.        MEDICAL INFORMATION:   She  has a past medical history of COPD (chronic obstru Acuity: Corrected Right Eye Chart Acuity: 20/25   Left Eye Visual Acuity: Corrected Left Eye Chart Acuity: 20/25   Both Eyes Visual Acuity: Corrected Both Eyes Chart Acuity: 20/30   Able To Tolerate Visual Acuity: Yes      General Appearance:  Alert, coope old female who presents for a Medicare Assessment.      PLAN SUMMARY:   Diagnoses and all orders for this visit:    Encounter for annual health examination  - completed today     Encounter for screening mammogram for malignant neoplasm of breast  -     CHRISTINA Documentation:   Malcik Galvin's SCREENING SCHEDULE   Tests on this list are recommended by your physician but may not be covered, or covered at this frequency, by your insurer.    Please check with your insurance carrier before scheduling to verify cove DENSITOMETRY (CPT=77080) 11/15/2019      No recommendations at this time   Pap and Pelvic    Pap   Covered every 2 years for women at normal risk;  Annually if at high risk 10/12/2020  Pap Smear,5 Years due on 10/12/2025    Chlamydia Annually if high risk -

## 2021-10-27 RX ORDER — BUDESONIDE AND FORMOTEROL FUMARATE DIHYDRATE 160; 4.5 UG/1; UG/1
2 AEROSOL RESPIRATORY (INHALATION) 2 TIMES DAILY
Qty: 1 EACH | Refills: 11 | Status: SHIPPED | OUTPATIENT
Start: 2021-10-27

## 2021-10-27 NOTE — TELEPHONE ENCOUNTER
Asthma & COPD Medication Protocol Failed 10/27/2021 03:08 PM    Asthma Action Score greater than or equal to 20    AAP/ACT given in last 12 months    Appointment in past 6 or next 3 months      Routing to provider per protocol.    Budesonide-Formoterol Fuma

## 2021-11-11 ENCOUNTER — TELEPHONE (OUTPATIENT)
Dept: FAMILY MEDICINE CLINIC | Facility: CLINIC | Age: 71
End: 2021-11-11

## 2021-11-11 NOTE — TELEPHONE ENCOUNTER
Blood pressure results reviewed. Pressures have improved from last visit. Continue present management and monitoring of symptoms. Advise continue to check blood pressures at home, once a week.   Follow-up with the office should blood pressures start to

## 2021-11-11 NOTE — TELEPHONE ENCOUNTER
Per 10/26/21 OV note:  Essential hypertension  -     CBC WITH DIFFERENTIAL WITH PLATELET; Future  -     COMP METABOLIC PANEL (14); Future  -     LIPID PANEL;  Future  -     VENIPUNCTURE  - on hydrochlorothiazide, high today, she will check at home and call

## 2021-11-11 NOTE — TELEPHONE ENCOUNTER
Pt calling just to give BP readings for the past 3 days     125/72    141/74    136/78  All after 4:30 pm and later     Pt was told to give office a call with her bp readings    Pt call back # [phone]    Thank you

## 2021-12-02 ENCOUNTER — TELEPHONE (OUTPATIENT)
Dept: FAMILY MEDICINE CLINIC | Facility: CLINIC | Age: 71
End: 2021-12-02

## 2021-12-02 ENCOUNTER — TELEMEDICINE (OUTPATIENT)
Dept: FAMILY MEDICINE CLINIC | Facility: CLINIC | Age: 71
End: 2021-12-02

## 2021-12-02 DIAGNOSIS — J34.89 SINUS PRESSURE: ICD-10-CM

## 2021-12-02 DIAGNOSIS — R06.00 PND (PAROXYSMAL NOCTURNAL DYSPNEA): ICD-10-CM

## 2021-12-02 DIAGNOSIS — R09.89 CHEST CONGESTION: Primary | ICD-10-CM

## 2021-12-02 PROCEDURE — 99213 OFFICE O/P EST LOW 20 MIN: CPT | Performed by: NURSE PRACTITIONER

## 2021-12-02 RX ORDER — AZITHROMYCIN 250 MG/1
TABLET, FILM COATED ORAL
Qty: 6 TABLET | Refills: 0 | Status: SHIPPED | OUTPATIENT
Start: 2021-12-02 | End: 2021-12-07

## 2021-12-02 RX ORDER — PREDNISONE 20 MG/1
20 TABLET ORAL 2 TIMES DAILY
Qty: 10 TABLET | Refills: 0 | Status: SHIPPED | OUTPATIENT
Start: 2021-12-02 | End: 2021-12-07

## 2021-12-02 NOTE — TELEPHONE ENCOUNTER
Called the pt and scheduled VV  I walked her through setting up the vv and she states that she will try    Future Appointments   Date Time Provider Jerald Lomeli   12/2/2021  1:30 PM Jenelle Damon [facility] CANDE Camacho

## 2021-12-02 NOTE — TELEPHONE ENCOUNTER
Patient notified and verbalized understanding. Patient states she would like to add that she can feel mucus at the base of her neck when she coughs. States she is wondering if she has bronchitis. States in the past zpack and prednisone has helped.   Ad

## 2021-12-02 NOTE — TELEPHONE ENCOUNTER
Pt called states has a cold stuffy nose and a cough would like to be seen in office if possible     Pt call back # [phone]    Thank you

## 2021-12-02 NOTE — PROGRESS NOTES
Subjective:   Patient ID: Lacy Ordonez is a 70year old female. Lacy Ordonez  verbally consents to a Virtual/Telephone Check-In service on 12/2/2021.     Patient understands and accepts financial responsibility for any deductible, co-insurance an every 6 (six) hours as needed for Wheezing. 18 g 0   • hydrochlorothiazide 12.5 MG Oral Tab Take 1 tablet (12.5 mg total) by mouth daily.  90 tablet 0   • INCRUSE ELLIPTA 62.5 MCG/INH Inhalation Aerosol Powder, Breath Activated Inhale 1 puff into the lungs Prescriptions     Signed Prescriptions Disp Refills   • azithromycin 250 MG Oral Tab 6 tablet 0     Sig: Take 2 tablets (500 mg total) by mouth daily for 1 day, THEN 1 tablet (250 mg total) daily for 4 days.    • predniSONE 20 MG Oral Tab 10 tablet 0     Si

## 2021-12-02 NOTE — TELEPHONE ENCOUNTER
Spoke with the pt     Started on Monday- scratchy throat prior that     Cough- starting to cough up some stuff- not very productive.   clear- nagging  cough  Ears have pressure started today  Taking Robitussin   Nebulizer  Inhaler-     Tight cough   Would l

## 2021-12-02 NOTE — TELEPHONE ENCOUNTER
Please offer VV to discuss her symptoms please. I would feel more comfortable rather than just sending in a prescription without talking with her first. My 430 spot should be open.

## 2021-12-07 ENCOUNTER — IMAGING SERVICES (OUTPATIENT)
Dept: OTHER | Age: 71
End: 2021-12-07
Attending: PHYSICIAN ASSISTANT

## 2021-12-08 ENCOUNTER — TELEPHONE (OUTPATIENT)
Dept: FAMILY MEDICINE CLINIC | Facility: CLINIC | Age: 71
End: 2021-12-08

## 2021-12-08 ENCOUNTER — TELEPHONE (OUTPATIENT)
Dept: ORTHOPEDICS | Age: 71
End: 2021-12-08

## 2021-12-08 NOTE — TELEPHONE ENCOUNTER
Wilder Thibodeaux [phone]     was advised of number to [facility]- will call and see if they can get pt in any sooner

## 2021-12-08 NOTE — TELEPHONE ENCOUNTER
Routing to covering provider- Damon Werner can not get pt in for 10 days, anyone else we can refer to?

## 2021-12-08 NOTE — TELEPHONE ENCOUNTER
Daughter called- states pt is back in the ER due to the pain    She has visit set up with Ginger Coy tomorrow with Dr. Sarthak Gutierrez

## 2021-12-08 NOTE — TELEPHONE ENCOUNTER
Pt went to [facility] ER yesterday     Pt has a broken ankle    Pt was referred to [address] [place] [address]. They cannot see pt for 10 days. Raquel Rodrigez is asking if there is a way we are able to get Odell Cuff seen sooner with [facility] or with another ortho.  Pt is in pain and doesn't w

## 2021-12-08 NOTE — TELEPHONE ENCOUNTER
Daughter Julian Purdy would like to talk to a nurse regarding pt   Per daughter pt was taken to [facility] ER yesterday evening     Julian Purdy # [phone]    Thank you

## 2021-12-09 ENCOUNTER — APPOINTMENT (OUTPATIENT)
Dept: ORTHOPEDICS | Age: 71
End: 2021-12-09

## 2021-12-16 RX ORDER — HYDROCHLOROTHIAZIDE 12.5 MG/1
12.5 TABLET ORAL DAILY
Qty: 90 TABLET | Refills: 0 | Status: SHIPPED | OUTPATIENT
Start: 2021-12-16

## 2021-12-16 NOTE — TELEPHONE ENCOUNTER
Hypertension Medications Protocol Passed 12/16/2021 03:44 PM   Protocol Details  CMP or BMP in past 12 months    Last serum creatinine< 2.0    Appointment in past 6 or next 3 months        Refilled per protocol  hydrochlorothiazide 12.5 MG Oral Tab  Last

## 2021-12-21 ENCOUNTER — TELEPHONE (OUTPATIENT)
Dept: FAMILY MEDICINE CLINIC | Facility: CLINIC | Age: 71
End: 2021-12-21

## 2021-12-21 NOTE — TELEPHONE ENCOUNTER
Pt called she fell 12/7 and had to have surgery on her tibia and fibula. She is wanting to know if Dr would be willing to sign paperwork for temporary handicap permit or if that is something that she needs the surgeon to sign?

## 2021-12-21 NOTE — TELEPHONE ENCOUNTER
Parking placard printed from [employer]    Patient notified and verbalized understanding.    Advised office will call once complete      Form placed in OP bin to complete

## 2022-03-17 NOTE — TELEPHONE ENCOUNTER
Called [facility] and spoke with Niraj Corona    Please disregard this call- the patient is the one that called and they do not even have a med name  They are reaching out to the patient Spontaneous, unlabored and symmetrical

## 2022-03-18 RX ORDER — HYDROCHLOROTHIAZIDE 12.5 MG/1
12.5 TABLET ORAL DAILY
Qty: 90 TABLET | Refills: 0 | Status: SHIPPED | OUTPATIENT
Start: 2022-03-18

## 2022-03-23 ENCOUNTER — TELEPHONE (OUTPATIENT)
Dept: FAMILY MEDICINE CLINIC | Facility: CLINIC | Age: 72
End: 2022-03-23

## 2022-03-26 NOTE — TELEPHONE ENCOUNTER
3/26/2022 9:47 AM    Admit Date: 3/24/2022    Admit Diagnosis: ISABELLA (acute kidney injury) ([facility] [N17.9]      Subjective:   No chest pain or shortness of breath      Objective:      Visit Vitals  /63   Pulse 81   Temp 98.4 °F (36.9 °C)   Resp 22   Ht 5' 4\" (1.626 m)   Wt 250 lb 12.8 oz (113.8 kg)   SpO2 98%   BMI 43.05 kg/m²       Physical Exam:  Graciella Churchman, Well Nourished, No Acute Distress, Alert & Oriented x 3, appropriate mood. Neck- supple, no JVD  CV- regular rate and rhythm no MRG  Lung- clear bilaterally  Abd- soft, nontender, nondistended  Ext- no edema bilaterally. Skin- warm and dry        Data Review:   Recent Labs     03/26/22  0332   *   K 2.9*   BUN 31*   CREA 1.60*   WBC 4.1*   HGB 8.1*   HCT 25.7*   *       Assessment/Plan:     Principal Problem:    ISABELLA (acute kidney injury) ([facility] (3/24/2022)    Active Problems:    Hypoxia, home O2 at night 3L (1/13/2010)      Diabetes mellitus ([address] (1/13/2010)      Hypothyroidism (1/13/2010)      Hyponatremia (1/13/2010)      Morbid obesity ([address] (1/14/2010)      Atherosclerosis of coronary artery (1/12/2022) stable with no angina. Will hold Brilinta through the weekend continuing aspirin for endoscopy next week. Need to restart at least Plavix with the aspirin as soon as possible given recent and combined with thrombotic lesion.       Overview: Last Assessment & Plan:       Formatting of this note is different from the original.      1-PRESENTATION WITH INFERIOR STEMI      2-TOTAL MID VESSEL OCCLUSION OF THE RCA WITH LARGE AMOUNT OF THROMBUS      3-RELATIVELY MILD LEFT SIDED CAD (40-50% proximal LAD)      4-INFERIOR WALL HYPOKINESIS, EF 50% WITH MR      5-SUCCESSFUL ASPIRATION THROMBECTOMY+ OSWALDO RCA (one distal branch fills       slowly)             - ASA, Brilinta, Lipitor, lopressor      Chronic obstructive pulmonary disease (HCC) ()      Overview: 10 years      Cirrhosis of liver (HCC) ()      ABLA (acute blood loss anemia) () Duplicate. hemoglobin is stable.   We will continue to follow      Thrombocytopenia ([facility] ()      Pancytopenia ([facility] (3/25/2022)      Secondary esophageal varices with bleeding ([facility] (3/25/2022)      Chronic diastolic congestive heart failure ([facility] (3/25/2022)

## 2022-04-06 RX ORDER — ALBUTEROL SULFATE 90 UG/1
AEROSOL, METERED RESPIRATORY (INHALATION)
Qty: 6.7 G | Refills: 0 | Status: SHIPPED | OUTPATIENT
Start: 2022-04-06

## 2022-04-06 NOTE — TELEPHONE ENCOUNTER
Asthma & COPD Medication Protocol Failed 04/06/2022 09:21 AM    Asthma Action Score greater than or equal to 20    AAP/ACT given in last 12 months    Appointment in past 6 or next 3 months         Routing to provider per protocol. albuterol (VENTOLIN HFA) 108 (90 Base) MCG/ACT Inhalation Aero Soln  Last refilled on 10/14/21 for #18g  with 0 rf. Last labs 10/26/21. Last seen on 12/2/21 telemedicine w/OP. Future Appointments   Date Time Provider Jerald Lomeli   10/18/2022 10:30 AM Jeremie Perdomo [facility] Mindy Zelaya          Thank you.

## 2022-06-20 RX ORDER — HYDROCHLOROTHIAZIDE 12.5 MG/1
12.5 TABLET ORAL DAILY
Qty: 90 TABLET | Refills: 1 | Status: SHIPPED | OUTPATIENT
Start: 2022-06-20 | End: 2022-12-13

## 2022-06-20 NOTE — TELEPHONE ENCOUNTER
Pt failed refill protocol for the following reasons:  Hypertension Medications Protocol Failed 06/20/2022 09:22 AM   Protocol Details  Appointment in past 6 or next 3 months           Last refill: 3- #90 with 0 refills  Last appt: 10-  Next appt: Future Appointments   Date Time Provider Jerald Lomeli   10/18/2022 10:30 AM Verito Farah [facility] CANDE Phan         Forward to Dr. Mak Berrios, please advise on refills. Thank you.

## 2022-06-21 ENCOUNTER — TELEPHONE (OUTPATIENT)
Dept: FAMILY MEDICINE CLINIC | Facility: CLINIC | Age: 72
End: 2022-06-21

## 2022-06-21 NOTE — TELEPHONE ENCOUNTER
Nothing has been received from Dr Wolff Nip office    Spoke with Nilton Duque at Dr Wolff Nip office who states she did not fax info yet but will do so later today.     Hold to confirm fax received

## 2022-06-21 NOTE — TELEPHONE ENCOUNTER
Patient is scheduled to have some screws taken out from a break on her left leg. Dr Rima Govea will be doing procedure on June 29, 2022. Patient is tentatively scheduled but not etched in stone. Patient will be having surgery at [facility] in [place]. Patient also says she will be needing and EKG and CXR. Dr Matthias Barrett faxed what patient needs this morning.

## 2022-06-24 NOTE — TELEPHONE ENCOUNTER
Medication failed protocol due to the following reasons:    Last refilled 5/11/20 for 1 inhaler with 3 RF  LOV with [facility] 9/30/19  No future appt  Routed to PCP to advise if needing appt to be refilled.     Asthma & COPD Medication Protocol Failed8/29 8:08 AM
Script sent please schedule OV prior to further fills
Sent [employer] message
no

## 2022-06-28 ENCOUNTER — OFFICE VISIT (OUTPATIENT)
Dept: FAMILY MEDICINE CLINIC | Facility: CLINIC | Age: 72
End: 2022-06-28
Payer: MEDICARE

## 2022-06-28 ENCOUNTER — HOSPITAL ENCOUNTER (OUTPATIENT)
Dept: GENERAL RADIOLOGY | Age: 72
Discharge: HOME | End: 2022-06-28
Attending: FAMILY MEDICINE
Payer: MEDICARE

## 2022-06-28 VITALS
DIASTOLIC BLOOD PRESSURE: 64 MMHG | HEART RATE: 80 BPM | OXYGEN SATURATION: 96 % | RESPIRATION RATE: 16 BRPM | HEIGHT: 66 IN | TEMPERATURE: 98 F | BODY MASS INDEX: 28.77 KG/M2 | SYSTOLIC BLOOD PRESSURE: 110 MMHG | WEIGHT: 179 LBS

## 2022-06-28 DIAGNOSIS — Z01.818 PRE-OPERATIVE EXAMINATION: Primary | ICD-10-CM

## 2022-06-28 DIAGNOSIS — S82.852S CLOSED TRIMALLEOLAR FRACTURE OF LEFT ANKLE, SEQUELA: ICD-10-CM

## 2022-06-28 DIAGNOSIS — K21.9 GASTROESOPHAGEAL REFLUX DISEASE WITHOUT ESOPHAGITIS: ICD-10-CM

## 2022-06-28 DIAGNOSIS — H40.2290 CHRONIC NARROW ANGLE GLAUCOMA: ICD-10-CM

## 2022-06-28 DIAGNOSIS — J41.0 SIMPLE CHRONIC BRONCHITIS (HCC): ICD-10-CM

## 2022-06-28 DIAGNOSIS — I10 ESSENTIAL HYPERTENSION: ICD-10-CM

## 2022-06-28 DIAGNOSIS — Z01.818 PRE-OPERATIVE EXAMINATION: ICD-10-CM

## 2022-06-28 LAB
BILIRUB UR QL STRIP.AUTO: NEGATIVE
COLOR UR AUTO: YELLOW
GLUCOSE UR STRIP.AUTO-MCNC: NEGATIVE MG/DL
LEUKOCYTE ESTERASE UR QL STRIP.AUTO: NEGATIVE
NITRITE UR QL STRIP.AUTO: NEGATIVE
PH UR STRIP.AUTO: 5 [PH] (ref 5–8)
PROT UR STRIP.AUTO-MCNC: NEGATIVE MG/DL
RBC UR QL AUTO: NEGATIVE
SP GR UR STRIP.AUTO: 1.02 (ref 1–1.03)
UROBILINOGEN UR STRIP.AUTO-MCNC: <2 MG/DL

## 2022-06-28 PROCEDURE — 99214 OFFICE O/P EST MOD 30 MIN: CPT | Performed by: FAMILY MEDICINE

## 2022-06-28 PROCEDURE — 93000 ELECTROCARDIOGRAM COMPLETE: CPT | Performed by: FAMILY MEDICINE

## 2022-06-28 PROCEDURE — 81001 URINALYSIS AUTO W/SCOPE: CPT | Performed by: FAMILY MEDICINE

## 2022-06-28 PROCEDURE — 71046 X-RAY EXAM CHEST 2 VIEWS: CPT | Performed by: FAMILY MEDICINE

## 2022-06-28 PROCEDURE — 87081 CULTURE SCREEN ONLY: CPT | Performed by: FAMILY MEDICINE

## 2022-06-28 RX ORDER — MELOXICAM 15 MG/1
TABLET ORAL
COMMUNITY
Start: 2022-06-21

## 2022-06-28 RX ORDER — PANTOPRAZOLE SODIUM 40 MG/1
TABLET, DELAYED RELEASE ORAL
COMMUNITY
Start: 2022-06-21

## 2022-06-30 ENCOUNTER — TELEPHONE (OUTPATIENT)
Dept: FAMILY MEDICINE CLINIC | Facility: CLINIC | Age: 72
End: 2022-06-30

## 2022-07-15 ENCOUNTER — APPOINTMENT (OUTPATIENT)
Dept: GENERAL RADIOLOGY | Age: 72
End: 2022-07-15
Attending: NURSE PRACTITIONER
Payer: MEDICARE

## 2022-07-15 ENCOUNTER — HOSPITAL ENCOUNTER (OUTPATIENT)
Age: 72
Discharge: HOME | End: 2022-07-15
Payer: MEDICARE

## 2022-07-15 ENCOUNTER — TELEPHONE (OUTPATIENT)
Dept: FAMILY MEDICINE CLINIC | Facility: CLINIC | Age: 72
End: 2022-07-15

## 2022-07-15 VITALS
BODY MASS INDEX: 29 KG/M2 | OXYGEN SATURATION: 94 % | TEMPERATURE: 99 F | SYSTOLIC BLOOD PRESSURE: 134 MMHG | RESPIRATION RATE: 18 BRPM | DIASTOLIC BLOOD PRESSURE: 76 MMHG | HEART RATE: 81 BPM | WEIGHT: 179 LBS

## 2022-07-15 DIAGNOSIS — U07.1 COVID: Primary | ICD-10-CM

## 2022-07-15 LAB — SARS-COV-2 RNA RESP QL NAA+PROBE: DETECTED

## 2022-07-15 PROCEDURE — M0222 INTRAVENOUS INJECTION, BEBTELOVIMAB, INCLUDES INJECTION AND POST ADMINISTRATIVE MONITORING: HCPCS | Performed by: NURSE PRACTITIONER

## 2022-07-15 PROCEDURE — U0002 COVID-19 LAB TEST NON-CDC: HCPCS | Performed by: NURSE PRACTITIONER

## 2022-07-15 PROCEDURE — 71046 X-RAY EXAM CHEST 2 VIEWS: CPT | Performed by: NURSE PRACTITIONER

## 2022-07-15 PROCEDURE — 99213 OFFICE O/P EST LOW 20 MIN: CPT | Performed by: NURSE PRACTITIONER

## 2022-07-15 RX ORDER — ACETAMINOPHEN 500 MG
1000 TABLET ORAL ONCE
Status: DISCONTINUED | OUTPATIENT
Start: 2022-07-15 | End: 2022-07-15

## 2022-07-15 RX ORDER — BEBTELOVIMAB 87.5 MG/ML
175 INJECTION, SOLUTION INTRAVENOUS ONCE
Status: COMPLETED | OUTPATIENT
Start: 2022-07-15 | End: 2022-07-15

## 2022-07-15 RX ORDER — UMECLIDINIUM 62.5 UG/1
1 AEROSOL, POWDER ORAL DAILY
COMMUNITY

## 2022-07-15 NOTE — TELEPHONE ENCOUNTER
Pt tested positive by home test for Covid yesterday. Low grade temp (under 100), congestion and cough. Been taking Robitussin and using nebulizer. Is there anything else she should do? Please advise.

## 2022-07-15 NOTE — TELEPHONE ENCOUNTER
Given her asthma, if she is within 5 days of  Starting symptoms, she can go to IC and see about the monoclonal antibody infusion. Otherwise, I can send in an antiviral called paxlovid that can reduce symptoms. If the breathing is getting worse, go to ER.

## 2022-07-15 NOTE — ED INITIAL ASSESSMENT (HPI)
Patient c/o low grade fever, chest congestion and cough since Wed. Tested + for Covid with a home test yesterday.

## 2022-07-18 ENCOUNTER — TELEPHONE (OUTPATIENT)
Dept: CASE MANAGEMENT | Age: 72
End: 2022-07-18

## 2022-07-18 NOTE — TELEPHONE ENCOUNTER
Pt received MAB infusion at [facility] on 7/15/22 for COVID-19. Please follow-up with pt for post-infusion assessment and home monitoring if needed. Thank you.

## 2022-07-18 NOTE — TELEPHONE ENCOUNTER
Spoke with patient who states yesterday cough was gone but had some congestion. State did not cough at all last night   No fever    States today she feels good as new. States she was advised to f/u with PCP in one week but wants to know if that is necessary. Discussed with KE who states if feeling fine no need for f/u. If any worsening call for appt    Patient notified and verbalized understanding.

## 2022-07-22 DIAGNOSIS — J44.9 CHRONIC OBSTRUCTIVE PULMONARY DISEASE, UNSPECIFIED COPD TYPE (HCC): ICD-10-CM

## 2022-07-22 RX ORDER — IPRATROPIUM BROMIDE AND ALBUTEROL SULFATE 2.5; .5 MG/3ML; MG/3ML
SOLUTION RESPIRATORY (INHALATION)
Qty: 270 ML | Refills: 0 | Status: CANCELLED | OUTPATIENT
Start: 2022-07-22

## 2022-07-22 NOTE — TELEPHONE ENCOUNTER
Asthma & COPD Medication Protocol Failed 07/22/2022 04:14 PM    Asthma Action Score greater than or equal to 20    AAP/ACT given in last 12 months    Appointment in past 6 or next 3 months      Routing to provider per protocol. ipratropium-albuterol 0.5-2.5 (3) MG/3ML Inhalation Solution  Last refilled on 4/9/21 for #270  with 0 rf. Last labs 12/11/21. Last seen on 6/28/22. Future Appointments   Date Time Provider Jerald Lomeli   10/18/2022 10:30 AM Sun Guzman, [facility] CANDE Norton          Thank you.

## 2022-07-23 DIAGNOSIS — J44.9 CHRONIC OBSTRUCTIVE PULMONARY DISEASE, UNSPECIFIED COPD TYPE (HCC): ICD-10-CM

## 2022-07-23 RX ORDER — UMECLIDINIUM 62.5 UG/1
1 AEROSOL, POWDER ORAL EVERY MORNING
Qty: 180 EACH | Refills: 1 | Status: SHIPPED | OUTPATIENT
Start: 2022-07-23 | End: 2023-01-19

## 2022-07-23 RX ORDER — IPRATROPIUM BROMIDE AND ALBUTEROL SULFATE 2.5; .5 MG/3ML; MG/3ML
SOLUTION RESPIRATORY (INHALATION)
Qty: 270 ML | Refills: 0 | Status: SHIPPED | OUTPATIENT
Start: 2022-07-23

## 2022-07-23 NOTE — TELEPHONE ENCOUNTER
Last OV 6/28/22   Last refilled 7-  #180  1 refill    Patient also has refill request for Duoneb from 7/22/22  Med pended in this encounter  Last refilled 4/9/21  #270  0 refill    Routed to DS as covering provider to advise

## 2022-07-28 ENCOUNTER — TELEMEDICINE (OUTPATIENT)
Dept: FAMILY MEDICINE CLINIC | Facility: CLINIC | Age: 72
End: 2022-07-28

## 2022-07-28 ENCOUNTER — TELEPHONE (OUTPATIENT)
Dept: FAMILY MEDICINE CLINIC | Facility: CLINIC | Age: 72
End: 2022-07-28

## 2022-07-28 DIAGNOSIS — J44.9 CHRONIC OBSTRUCTIVE PULMONARY DISEASE, UNSPECIFIED COPD TYPE (HCC): ICD-10-CM

## 2022-07-28 DIAGNOSIS — J01.10 ACUTE NON-RECURRENT FRONTAL SINUSITIS: ICD-10-CM

## 2022-07-28 DIAGNOSIS — U07.1 COVID-19: Primary | ICD-10-CM

## 2022-07-28 DIAGNOSIS — J41.0 SIMPLE CHRONIC BRONCHITIS (HCC): ICD-10-CM

## 2022-07-28 PROCEDURE — 99214 OFFICE O/P EST MOD 30 MIN: CPT | Performed by: FAMILY MEDICINE

## 2022-07-28 RX ORDER — AZITHROMYCIN 250 MG/1
TABLET, FILM COATED ORAL
Qty: 6 TABLET | Refills: 0 | Status: SHIPPED | OUTPATIENT
Start: 2022-07-28 | End: 2022-08-02

## 2022-07-28 RX ORDER — PREDNISONE 20 MG/1
20 TABLET ORAL DAILY
Qty: 5 TABLET | Refills: 0 | Status: SHIPPED | OUTPATIENT
Start: 2022-07-28 | End: 2022-08-02

## 2022-07-28 NOTE — TELEPHONE ENCOUNTER
Pt is still having trouble with a cough from Covid. Cough starts around 5:30 pm for about an hour. Feels like something in throat gurgling but nothing much comes up. Been going on for a week now. Pt has been doing breathing treatments and taking robutussim. Pt thinks she needs prednisone and/or a z-monica. Can you prescribe?           [id number] - [place] - [address] [facility], [phone]   [address] [place] [address] Char[place] Tawanda [id number]   Phone: [phone] Fax: [phone]   Hours: Not open 24 hours

## 2022-07-28 NOTE — TELEPHONE ENCOUNTER
Patient notified and verbalized understanding.    Future Appointments   Date Time Provider Jerald Lomeli   7/28/2022  3:00 PM [facility] Kerri Camp   10/18/2022 10:30 AM DO MALLORY Turner

## 2022-08-11 ENCOUNTER — TELEPHONE (OUTPATIENT)
Dept: FAMILY MEDICINE CLINIC | Facility: CLINIC | Age: 72
End: 2022-08-11

## 2022-08-11 NOTE — TELEPHONE ENCOUNTER
Called and spoke with patient. States she is using her daily inhalers like normal.  Was using nebulizer 3 times daily but is now just using it first thing in the morning. Asked if she was using in the morning due to difficulty breathing or as a precaution. States more for precaution. States she can do everything she wants to do. Breathing is back to normal.  Does not feel bad. States the zpack and prednisone cleared her up quickly but still feels some congestion in the suprasternal notch area. States her voice has some raspiness as well.

## 2022-08-11 NOTE — TELEPHONE ENCOUNTER
PT WAS SUPPOSE TO CALL BACK WITH A CONDITION UPDATE    PT ADV VOICE IS STILL RASPY, STILL HAS SOME THROAT CONGESTION. OTHER THAN THAT SHE FEELS PRETTY GOOD.     [address] [facility]

## 2022-08-11 NOTE — TELEPHONE ENCOUNTER
I'm glad she is feeling better. Is she feeling as well as she hoped? Still needs improvement? How often is she needing rescue inhaler still?

## 2022-08-11 NOTE — TELEPHONE ENCOUNTER
Glad she is getting better. The raspyness might last awhile from the illness and the inhalers. If it's getting worse or not better in the next month or so, then let me know or come back in.

## 2022-08-15 ENCOUNTER — TELEPHONE (OUTPATIENT)
Dept: FAMILY MEDICINE CLINIC | Facility: CLINIC | Age: 72
End: 2022-08-15

## 2022-08-15 RX ORDER — ALBUTEROL SULFATE 90 UG/1
2 AEROSOL, METERED RESPIRATORY (INHALATION) EVERY 6 HOURS PRN
Qty: 6.7 G | Refills: 5 | Status: SHIPPED | OUTPATIENT
Start: 2022-08-15

## 2022-08-15 NOTE — TELEPHONE ENCOUNTER
Faxed refill request received and completed by KENDELL    Refilled electronically as per written order  Sent to scan

## 2022-10-18 ENCOUNTER — OFFICE VISIT (OUTPATIENT)
Dept: FAMILY MEDICINE CLINIC | Facility: CLINIC | Age: 72
End: 2022-10-18
Payer: MEDICARE

## 2022-10-18 VITALS — WEIGHT: 174.38 LBS | BODY MASS INDEX: 28.03 KG/M2 | HEIGHT: 66 IN

## 2022-10-18 DIAGNOSIS — K21.9 GASTROESOPHAGEAL REFLUX DISEASE WITHOUT ESOPHAGITIS: ICD-10-CM

## 2022-10-18 DIAGNOSIS — R73.9 HYPERGLYCEMIA: ICD-10-CM

## 2022-10-18 DIAGNOSIS — Z12.31 ENCOUNTER FOR SCREENING MAMMOGRAM FOR MALIGNANT NEOPLASM OF BREAST: ICD-10-CM

## 2022-10-18 DIAGNOSIS — H40.2290 CHRONIC NARROW ANGLE GLAUCOMA: ICD-10-CM

## 2022-10-18 DIAGNOSIS — Z12.11 SCREENING FOR MALIGNANT NEOPLASM OF COLON: ICD-10-CM

## 2022-10-18 DIAGNOSIS — S82.892D CLOSED FRACTURE OF LEFT ANKLE WITH ROUTINE HEALING, SUBSEQUENT ENCOUNTER: ICD-10-CM

## 2022-10-18 DIAGNOSIS — Z13.820 ENCOUNTER FOR OSTEOPOROSIS SCREENING IN ASYMPTOMATIC POSTMENOPAUSAL PATIENT: ICD-10-CM

## 2022-10-18 DIAGNOSIS — Z78.0 ENCOUNTER FOR OSTEOPOROSIS SCREENING IN ASYMPTOMATIC POSTMENOPAUSAL PATIENT: ICD-10-CM

## 2022-10-18 DIAGNOSIS — E55.9 VITAMIN D DEFICIENCY: ICD-10-CM

## 2022-10-18 DIAGNOSIS — J41.0 SIMPLE CHRONIC BRONCHITIS (HCC): ICD-10-CM

## 2022-10-18 DIAGNOSIS — J45.41 MODERATE PERSISTENT ASTHMA WITH ACUTE EXACERBATION: ICD-10-CM

## 2022-10-18 DIAGNOSIS — Z00.00 ENCOUNTER FOR ANNUAL HEALTH EXAMINATION: Primary | ICD-10-CM

## 2022-10-18 DIAGNOSIS — I10 ESSENTIAL HYPERTENSION: ICD-10-CM

## 2022-10-18 DIAGNOSIS — M85.89 OSTEOPENIA OF MULTIPLE SITES: ICD-10-CM

## 2022-10-18 PROBLEM — J45.909 ASTHMA (HCC): Status: ACTIVE | Noted: 2022-10-18

## 2022-10-18 PROBLEM — J45.909 ASTHMA: Status: ACTIVE | Noted: 2022-10-18

## 2022-10-18 PROBLEM — S82.892A CLOSED LEFT ANKLE FRACTURE: Status: ACTIVE | Noted: 2021-12-09

## 2022-10-18 LAB
ALBUMIN SERPL-MCNC: 3.4 G/DL (ref 3.4–5)
ALBUMIN/GLOB SERPL: 0.9 {RATIO} (ref 1–2)
ALP LIVER SERPL-CCNC: 68 U/L
ALT SERPL-CCNC: 29 U/L
ANION GAP SERPL CALC-SCNC: 6 MMOL/L (ref 0–18)
AST SERPL-CCNC: 25 U/L (ref 15–37)
BILIRUB SERPL-MCNC: 0.7 MG/DL (ref 0.1–2)
BUN BLD-MCNC: 16 MG/DL (ref 7–18)
CALCIUM BLD-MCNC: 9.5 MG/DL (ref 8.5–10.1)
CHLORIDE SERPL-SCNC: 105 MMOL/L (ref 98–112)
CHOLEST SERPL-MCNC: 196 MG/DL (ref ?–200)
CO2 SERPL-SCNC: 28 MMOL/L (ref 21–32)
CREAT BLD-MCNC: 0.86 MG/DL
EST. AVERAGE GLUCOSE BLD GHB EST-MCNC: 117 MG/DL (ref 68–126)
FASTING PATIENT LIPID ANSWER: YES
FASTING STATUS PATIENT QL REPORTED: YES
GFR SERPLBLD BASED ON 1.73 SQ M-ARVRAT: 72 ML/MIN/1.73M2 (ref 60–?)
GLOBULIN PLAS-MCNC: 3.7 G/DL (ref 2.8–4.4)
GLUCOSE BLD-MCNC: 88 MG/DL (ref 70–99)
HBA1C MFR BLD: 5.7 % (ref ?–5.7)
HDLC SERPL-MCNC: 72 MG/DL (ref 40–59)
LDLC SERPL CALC-MCNC: 116 MG/DL (ref ?–100)
NONHDLC SERPL-MCNC: 124 MG/DL (ref ?–130)
OSMOLALITY SERPL CALC.SUM OF ELEC: 289 MOSM/KG (ref 275–295)
POTASSIUM SERPL-SCNC: 3.7 MMOL/L (ref 3.5–5.1)
PROT SERPL-MCNC: 7.1 G/DL (ref 6.4–8.2)
SODIUM SERPL-SCNC: 139 MMOL/L (ref 136–145)
TRIGL SERPL-MCNC: 40 MG/DL (ref 30–149)
VLDLC SERPL CALC-MCNC: 7 MG/DL (ref 0–30)

## 2022-10-18 PROCEDURE — 80061 LIPID PANEL: CPT | Performed by: FAMILY MEDICINE

## 2022-10-18 PROCEDURE — 1125F AMNT PAIN NOTED PAIN PRSNT: CPT | Performed by: FAMILY MEDICINE

## 2022-10-18 PROCEDURE — 85025 COMPLETE CBC W/AUTO DIFF WBC: CPT | Performed by: FAMILY MEDICINE

## 2022-10-18 PROCEDURE — 83036 HEMOGLOBIN GLYCOSYLATED A1C: CPT | Performed by: FAMILY MEDICINE

## 2022-10-18 PROCEDURE — G0439 PPPS, SUBSEQ VISIT: HCPCS | Performed by: FAMILY MEDICINE

## 2022-10-18 PROCEDURE — 80053 COMPREHEN METABOLIC PANEL: CPT | Performed by: FAMILY MEDICINE

## 2022-10-18 RX ORDER — PREDNISONE 20 MG/1
40 TABLET ORAL DAILY
Qty: 10 TABLET | Refills: 0 | Status: SHIPPED | OUTPATIENT
Start: 2022-10-18 | End: 2022-10-23

## 2022-10-18 RX ORDER — TRAMADOL HYDROCHLORIDE 50 MG/1
TABLET ORAL
COMMUNITY
Start: 2022-06-26

## 2022-10-18 RX ORDER — BRIMONIDINE TARTRATE 2 MG/ML
SOLUTION/ DROPS OPHTHALMIC
COMMUNITY
Start: 2022-09-22

## 2022-10-18 RX ORDER — PREDNISONE 20 MG/1
20 TABLET ORAL DAILY
Qty: 21 TABLET | Refills: 0 | Status: SHIPPED | OUTPATIENT
Start: 2022-10-18 | End: 2022-10-18

## 2022-10-19 ENCOUNTER — TELEPHONE (OUTPATIENT)
Dept: FAMILY MEDICINE CLINIC | Facility: CLINIC | Age: 72
End: 2022-10-19

## 2022-10-19 DIAGNOSIS — Z00.00 ENCOUNTER FOR ANNUAL HEALTH EXAMINATION: Primary | ICD-10-CM

## 2022-10-19 NOTE — TELEPHONE ENCOUNTER
LOW FROM [facility] CALLED AND ADV THAT THE CBC HAS CLOTTED AND IT NEEDS TO BE REDRAWN. PLEASE NOTIFY PATIENT.       Casi Dave

## 2022-10-19 NOTE — TELEPHONE ENCOUNTER
Left detailed message to voicemail (per verbal release form consent with confirmed identifying message) of note below.  Patient was advised to call office back - need to schedule NV for redraw - NO charge    Order placed

## 2022-10-28 ENCOUNTER — HOSPITAL ENCOUNTER (OUTPATIENT)
Dept: MAMMOGRAPHY | Age: 72
Discharge: HOME | End: 2022-10-28
Attending: FAMILY MEDICINE
Payer: MEDICARE

## 2022-10-28 DIAGNOSIS — Z12.31 ENCOUNTER FOR SCREENING MAMMOGRAM FOR MALIGNANT NEOPLASM OF BREAST: ICD-10-CM

## 2022-10-28 PROCEDURE — 77063 BREAST TOMOSYNTHESIS BI: CPT | Performed by: FAMILY MEDICINE

## 2022-10-28 PROCEDURE — 77067 SCR MAMMO BI INCL CAD: CPT | Performed by: FAMILY MEDICINE

## 2022-11-11 ENCOUNTER — HOSPITAL ENCOUNTER (OUTPATIENT)
Dept: BONE DENSITY | Age: 72
Discharge: HOME | End: 2022-11-11
Attending: FAMILY MEDICINE
Payer: MEDICARE

## 2022-11-11 DIAGNOSIS — Z13.820 ENCOUNTER FOR OSTEOPOROSIS SCREENING IN ASYMPTOMATIC POSTMENOPAUSAL PATIENT: ICD-10-CM

## 2022-11-11 DIAGNOSIS — Z78.0 ENCOUNTER FOR OSTEOPOROSIS SCREENING IN ASYMPTOMATIC POSTMENOPAUSAL PATIENT: ICD-10-CM

## 2022-11-11 PROCEDURE — 77080 DXA BONE DENSITY AXIAL: CPT | Performed by: FAMILY MEDICINE

## 2022-11-15 ENCOUNTER — TELEPHONE (OUTPATIENT)
Dept: FAMILY MEDICINE CLINIC | Facility: CLINIC | Age: 72
End: 2022-11-15

## 2022-11-15 NOTE — TELEPHONE ENCOUNTER
Patient notified via detailed voicemail left at cell number (ok per  HIPAA consent)  Asked patient to call office back to discuss further

## 2022-11-15 NOTE — TELEPHONE ENCOUNTER
Pls let pt know that her bone density test shows osteoporosis. We should consider treating this to build the bone back up. If she doesn't have GERD or heart burn, we can do fosamax once weekly. Could also consider prolia injections every 6 months, if she would rather.

## 2022-11-18 DIAGNOSIS — J44.9 CHRONIC OBSTRUCTIVE PULMONARY DISEASE, UNSPECIFIED COPD TYPE (HCC): ICD-10-CM

## 2022-11-18 RX ORDER — IPRATROPIUM BROMIDE AND ALBUTEROL SULFATE 2.5; .5 MG/3ML; MG/3ML
SOLUTION RESPIRATORY (INHALATION)
Qty: 270 ML | Refills: 0 | Status: SHIPPED | OUTPATIENT
Start: 2022-11-18

## 2022-12-13 RX ORDER — HYDROCHLOROTHIAZIDE 12.5 MG/1
12.5 TABLET ORAL DAILY
Qty: 90 TABLET | Refills: 1 | Status: SHIPPED | OUTPATIENT
Start: 2022-12-13

## 2022-12-13 NOTE — TELEPHONE ENCOUNTER
Hypertension Medications Protocol Passed 12/13/2022 08:34 AM   Protocol Details  CMP or BMP in past 12 months    Last serum creatinine< 2.0    Appointment in past 6 or next 3 months     Last OV 10/18/22  Last lab 10/18/22 CMP/Creat 0.86  Last refilled 6/20/22  #90  1 refilled

## 2023-01-05 RX ORDER — BUDESONIDE AND FORMOTEROL FUMARATE DIHYDRATE 160; 4.5 UG/1; UG/1
AEROSOL RESPIRATORY (INHALATION)
Qty: 10.2 G | Refills: 11 | Status: SHIPPED | OUTPATIENT
Start: 2023-01-05

## 2023-01-05 NOTE — TELEPHONE ENCOUNTER
Asthma & COPD Medication Protocol Failed 01/05/2023 09:52 AM    Asthma Action Score greater than or equal to 20    AAP/ACT given in last 12 months    Appointment in past 6 or next 3 months         Last OV 10/18/22  Last refilled 10/27/21  #1  11 refills

## 2023-01-12 ENCOUNTER — TELEPHONE (OUTPATIENT)
Dept: FAMILY MEDICINE CLINIC | Facility: CLINIC | Age: 73
End: 2023-01-12

## 2023-01-12 NOTE — TELEPHONE ENCOUNTER
Pt states that Symbicort is not covered anymore by [employer] unless she gets a coverage determination or an exception. Pt has 3 months of medication left. Please advise what she should do. Different Medication? Thank you!

## 2023-01-26 ENCOUNTER — TELEPHONE (OUTPATIENT)
Dept: FAMILY MEDICINE CLINIC | Facility: CLINIC | Age: 73
End: 2023-01-26

## 2023-01-26 NOTE — TELEPHONE ENCOUNTER
Spoke with patient who states Rx Benefits is through her insurance.  States she did speak to them regarding acyclovir and it is ok for RN to call and give them information    Called and spoke with clinical review at [address] [place] [address],[address] and provided B00.1 cold sores as Dx    States he will update information

## 2023-01-26 NOTE — TELEPHONE ENCOUNTER
[facility] [employer] IS CALLING. PATIENT CALLED THEM REQUESTING REFILL ON ACYCLOVIR 5% OINTMENT. PHARMACY IS ASKING THE REASON FOR MEDICATION. I INFORMED PHARMACY THAT I ONLY SEE THE ACYCLOVIR FROM 2020. RX [employer]EF[employer] SEE THE SAME.  CALL BACK NUMBER [phone] REFERENCE NUMBER: [id number]

## 2023-01-27 NOTE — TELEPHONE ENCOUNTER
Fax from [facility] stating acyclovir ointment is authorized   Case# [id number]  Valid until 1/26/24

## 2023-02-03 RX ORDER — ACYCLOVIR 50 MG/G
1 OINTMENT TOPICAL
Qty: 15 G | Refills: 0 | Status: SHIPPED | OUTPATIENT
Start: 2023-02-03

## 2023-02-03 NOTE — TELEPHONE ENCOUNTER
Last OV 10/18/22  Acyclovir last refilled 8-5-2020  15 g  0 refills           symbicort was already refilled 1/5/23

## 2023-02-03 NOTE — TELEPHONE ENCOUNTER
PT CALLED AND ADV NEEDS 2 REFILLS.     PT ADV THAT INSURANCE WILL COVER   NAME BRAND SYMBICORT. (NOT GENERIC)    SYMBICORT 160-4.5 MCG/ACT Inhalation Aerosol    ADV TIER 3 $45  GENERIC WILL COME UP OVER $300      ALSO NEEDS:       ACYCLOVIR 5 % External Ointment      PLEASE SEND TO BRENNA CORREA    THANK YOU

## 2023-03-01 ENCOUNTER — TELEPHONE (OUTPATIENT)
Dept: FAMILY MEDICINE CLINIC | Facility: CLINIC | Age: 73
End: 2023-03-01

## 2023-03-01 ENCOUNTER — OFFICE VISIT (OUTPATIENT)
Dept: FAMILY MEDICINE CLINIC | Facility: CLINIC | Age: 73
End: 2023-03-01
Payer: MEDICARE

## 2023-03-01 VITALS
WEIGHT: 170 LBS | OXYGEN SATURATION: 94 % | DIASTOLIC BLOOD PRESSURE: 90 MMHG | BODY MASS INDEX: 27 KG/M2 | HEART RATE: 108 BPM | TEMPERATURE: 99 F | SYSTOLIC BLOOD PRESSURE: 130 MMHG

## 2023-03-01 DIAGNOSIS — J41.0 SIMPLE CHRONIC BRONCHITIS (HCC): ICD-10-CM

## 2023-03-01 DIAGNOSIS — J01.00 ACUTE NON-RECURRENT MAXILLARY SINUSITIS: Primary | ICD-10-CM

## 2023-03-01 PROCEDURE — 99213 OFFICE O/P EST LOW 20 MIN: CPT | Performed by: NURSE PRACTITIONER

## 2023-03-01 RX ORDER — PREDNISONE 20 MG/1
20 TABLET ORAL DAILY
Qty: 5 TABLET | Refills: 0 | Status: SHIPPED | OUTPATIENT
Start: 2023-03-01 | End: 2023-03-06

## 2023-03-01 RX ORDER — AZITHROMYCIN 250 MG/1
TABLET, FILM COATED ORAL
Qty: 6 TABLET | Refills: 0 | Status: SHIPPED | OUTPATIENT
Start: 2023-03-01 | End: 2023-03-06

## 2023-03-01 NOTE — TELEPHONE ENCOUNTER
Pt reports drippy nose - started last week  Pt c/o Post nasal drip, Cough  \"this is how it goes for me\" - usually gets abx and steroid    Low grade fever this am - 99    No home covid test  Advised pt to take covid test if able - she v/u    Pt requesting Rx abx and steroids    Advised pt that Dr. Cata Flower not in office today, pt agreeable to schedule with APRN    Future Appointments   Date Time Provider Jerald Lomeli   3/1/2023  1:40 PM CMKENNA Srivastava [facility] Gottron to provider as Anabell Wallace

## 2023-03-01 NOTE — TELEPHONE ENCOUNTER
Pt states she is getting her yearly congestion. Is coughing - productive - light green. Low-grade fever. Doesn't feel like congestion is in lungs but in throat by collar bone. Would like to stop before it goes to lungs. Can she get a zpak and prednisone? Please advise. [facility][id number] - [place] Stiles - [address], [phone]   [facility] [place] [address]   Phone: [phone] Fax: [phone]     Thank you!

## 2023-06-09 RX ORDER — HYDROCHLOROTHIAZIDE 12.5 MG/1
12.5 TABLET ORAL DAILY
Qty: 90 TABLET | Refills: 1 | Status: SHIPPED | OUTPATIENT
Start: 2023-06-09

## 2023-07-18 RX ORDER — UMECLIDINIUM 62.5 UG/1
1 AEROSOL, POWDER ORAL EVERY MORNING
Qty: 180 EACH | Refills: 0 | Status: SHIPPED | OUTPATIENT
Start: 2023-07-18 | End: 2023-07-21

## 2023-07-21 RX ORDER — UMECLIDINIUM 62.5 UG/1
1 AEROSOL, POWDER ORAL EVERY MORNING
Qty: 180 EACH | Refills: 0 | Status: SHIPPED | OUTPATIENT
Start: 2023-07-21

## 2023-07-21 NOTE — TELEPHONE ENCOUNTER
Sent to pharmacy as: Incruse Ellipta 62.5 MCG/ACT Inhalation Aerosol Powder Breath Activated    Notes to Pharmacy: Prescriber changed. Previous call reason Refill Request.    E-Prescribing Status: Receipt confirmed by pharmacy (7/18/2023  3:12 PM CDT)      [facility][id number] - [place] - [address] [place] [address] [phone], [phone]       Rx resent today    Advised patient of note above. Patient verbalized understanding. No further questions at this time.

## 2023-08-19 ENCOUNTER — IMAGING SERVICES (OUTPATIENT)
Dept: OTHER | Age: 73
End: 2023-08-19

## 2023-08-20 ENCOUNTER — HOSPITAL ENCOUNTER (INPATIENT)
Age: 73
LOS: 3 days | Discharge: HOME HEALTH | DRG: 494 | End: 2023-08-23
Attending: FAMILY MEDICINE | Admitting: INTERNAL MEDICINE

## 2023-08-20 DIAGNOSIS — S82.141A CLOSED FRACTURE OF RIGHT TIBIAL PLATEAU, INITIAL ENCOUNTER: Primary | ICD-10-CM

## 2023-08-20 PROBLEM — S82.109S: Status: ACTIVE | Noted: 2023-08-20

## 2023-08-20 PROBLEM — I10 ESSENTIAL HYPERTENSION: Status: ACTIVE | Noted: 2017-12-31

## 2023-08-20 PROBLEM — K21.9 GASTROESOPHAGEAL REFLUX DISEASE WITHOUT ESOPHAGITIS: Status: ACTIVE | Noted: 2018-09-28

## 2023-08-20 PROBLEM — H40.2290 CHRONIC NARROW ANGLE GLAUCOMA: Status: ACTIVE | Noted: 2019-09-30

## 2023-08-20 PROBLEM — J44.9 COPD (CHRONIC OBSTRUCTIVE PULMONARY DISEASE) (CMD): Status: ACTIVE | Noted: 2023-08-20

## 2023-08-20 LAB
ATRIAL RATE (BPM): 73
P AXIS (DEGREES): 77
PR-INTERVAL (MSEC): 140
QRS-INTERVAL (MSEC): 96
QT-INTERVAL (MSEC): 388
QTC: 427
R AXIS (DEGREES): -40
REPORT TEXT: NORMAL
T AXIS (DEGREES): 68
VENTRICULAR RATE EKG/MIN (BPM): 73

## 2023-08-20 PROCEDURE — 93005 ELECTROCARDIOGRAM TRACING: CPT | Performed by: INTERNAL MEDICINE

## 2023-08-20 PROCEDURE — 10002803 HB RX 637: Performed by: INTERNAL MEDICINE

## 2023-08-20 PROCEDURE — 10004651 HB RX, NO CHARGE ITEM: Performed by: INTERNAL MEDICINE

## 2023-08-20 PROCEDURE — 99223 1ST HOSP IP/OBS HIGH 75: CPT | Performed by: INTERNAL MEDICINE

## 2023-08-20 PROCEDURE — 10000002 HB ROOM CHARGE MED SURG

## 2023-08-20 PROCEDURE — 94640 AIRWAY INHALATION TREATMENT: CPT

## 2023-08-20 RX ORDER — UMECLIDINIUM 62.5 UG/1
1 AEROSOL, POWDER ORAL DAILY
COMMUNITY

## 2023-08-20 RX ORDER — ACETAMINOPHEN 325 MG/1
975 TABLET ORAL EVERY 8 HOURS
Status: ON HOLD | COMMUNITY
End: 2023-08-23 | Stop reason: HOSPADM

## 2023-08-20 RX ORDER — ONDANSETRON 2 MG/ML
4 INJECTION INTRAMUSCULAR; INTRAVENOUS 2 TIMES DAILY PRN
Status: DISCONTINUED | OUTPATIENT
Start: 2023-08-20 | End: 2023-08-23 | Stop reason: HOSPADM

## 2023-08-20 RX ORDER — NAPROXEN SODIUM 220 MG
220 TABLET ORAL 2 TIMES DAILY PRN
Status: ON HOLD | COMMUNITY
End: 2023-08-23 | Stop reason: HOSPADM

## 2023-08-20 RX ORDER — ALBUTEROL SULFATE 90 UG/1
2 AEROSOL, METERED RESPIRATORY (INHALATION) EVERY 4 HOURS PRN
COMMUNITY

## 2023-08-20 RX ORDER — DOCUSATE SODIUM 100 MG/1
100 CAPSULE, LIQUID FILLED ORAL 2 TIMES DAILY PRN
COMMUNITY

## 2023-08-20 RX ORDER — BUDESONIDE AND FORMOTEROL FUMARATE DIHYDRATE 160; 4.5 UG/1; UG/1
2 AEROSOL RESPIRATORY (INHALATION) 2 TIMES DAILY
COMMUNITY

## 2023-08-20 RX ORDER — TRAMADOL HYDROCHLORIDE 50 MG/1
50 TABLET ORAL EVERY 6 HOURS PRN
Status: DISCONTINUED | OUTPATIENT
Start: 2023-08-20 | End: 2023-08-23 | Stop reason: HOSPADM

## 2023-08-20 RX ORDER — BUDESONIDE AND FORMOTEROL FUMARATE DIHYDRATE 160; 4.5 UG/1; UG/1
2 AEROSOL RESPIRATORY (INHALATION) 2 TIMES DAILY
Status: DISCONTINUED | OUTPATIENT
Start: 2023-08-20 | End: 2023-08-23 | Stop reason: HOSPADM

## 2023-08-20 RX ORDER — PANTOPRAZOLE SODIUM 40 MG/1
40 TABLET, DELAYED RELEASE ORAL
Status: DISCONTINUED | OUTPATIENT
Start: 2023-08-21 | End: 2023-08-20

## 2023-08-20 RX ORDER — HYDROCHLOROTHIAZIDE 12.5 MG/1
12.5 TABLET ORAL DAILY
COMMUNITY

## 2023-08-20 RX ORDER — IPRATROPIUM BROMIDE AND ALBUTEROL SULFATE 2.5; .5 MG/3ML; MG/3ML
3 SOLUTION RESPIRATORY (INHALATION) EVERY 6 HOURS PRN
Status: DISCONTINUED | OUTPATIENT
Start: 2023-08-20 | End: 2023-08-23 | Stop reason: HOSPADM

## 2023-08-20 RX ORDER — LATANOPROST 50 UG/ML
1 SOLUTION/ DROPS OPHTHALMIC DAILY
COMMUNITY

## 2023-08-20 RX ORDER — IPRATROPIUM BROMIDE AND ALBUTEROL SULFATE 2.5; .5 MG/3ML; MG/3ML
3 SOLUTION RESPIRATORY (INHALATION) EVERY 6 HOURS PRN
COMMUNITY

## 2023-08-20 RX ORDER — PANTOPRAZOLE SODIUM 40 MG/1
40 TABLET, DELAYED RELEASE ORAL 2 TIMES DAILY
Status: ON HOLD | COMMUNITY
End: 2023-08-23 | Stop reason: HOSPADM

## 2023-08-20 RX ORDER — BRIMONIDINE TARTRATE 2 MG/ML
1 SOLUTION/ DROPS OPHTHALMIC EVERY 12 HOURS
Status: DISCONTINUED | OUTPATIENT
Start: 2023-08-20 | End: 2023-08-23 | Stop reason: HOSPADM

## 2023-08-20 RX ORDER — AMOXICILLIN 250 MG
2 CAPSULE ORAL DAILY PRN
Status: DISCONTINUED | OUTPATIENT
Start: 2023-08-20 | End: 2023-08-23 | Stop reason: HOSPADM

## 2023-08-20 RX ORDER — 0.9 % SODIUM CHLORIDE 0.9 %
2 VIAL (ML) INJECTION EVERY 12 HOURS SCHEDULED
Status: DISCONTINUED | OUTPATIENT
Start: 2023-08-20 | End: 2023-08-23 | Stop reason: HOSPADM

## 2023-08-20 RX ORDER — ALBUTEROL SULFATE 90 UG/1
2 AEROSOL, METERED RESPIRATORY (INHALATION) EVERY 4 HOURS PRN
Status: DISCONTINUED | OUTPATIENT
Start: 2023-08-20 | End: 2023-08-23 | Stop reason: HOSPADM

## 2023-08-20 RX ORDER — LATANOPROST 50 UG/ML
1 SOLUTION/ DROPS OPHTHALMIC DAILY
Status: DISCONTINUED | OUTPATIENT
Start: 2023-08-21 | End: 2023-08-23 | Stop reason: HOSPADM

## 2023-08-20 RX ORDER — POLYETHYLENE GLYCOL 3350 17 G/17G
17 POWDER, FOR SOLUTION ORAL DAILY PRN
Status: DISCONTINUED | OUTPATIENT
Start: 2023-08-20 | End: 2023-08-23 | Stop reason: HOSPADM

## 2023-08-20 RX ORDER — BRIMONIDINE TARTRATE 2 MG/ML
1 SOLUTION/ DROPS OPHTHALMIC EVERY 12 HOURS
COMMUNITY

## 2023-08-20 RX ORDER — FAMOTIDINE 20 MG/1
20 TABLET, FILM COATED ORAL NIGHTLY
Status: DISCONTINUED | OUTPATIENT
Start: 2023-08-20 | End: 2023-08-23 | Stop reason: HOSPADM

## 2023-08-20 RX ORDER — ACETAMINOPHEN 325 MG/1
650 TABLET ORAL EVERY 4 HOURS PRN
Status: DISCONTINUED | OUTPATIENT
Start: 2023-08-20 | End: 2023-08-21

## 2023-08-20 RX ADMIN — FAMOTIDINE 20 MG: 20 TABLET, FILM COATED ORAL at 22:15

## 2023-08-20 RX ADMIN — TRAMADOL HYDROCHLORIDE 50 MG: 50 TABLET, COATED ORAL at 22:18

## 2023-08-20 RX ADMIN — BRIMONIDINE TARTRATE 1 DROP: 2 SOLUTION OPHTHALMIC at 22:19

## 2023-08-20 RX ADMIN — SODIUM CHLORIDE, PRESERVATIVE FREE 2 ML: 5 INJECTION INTRAVENOUS at 22:00

## 2023-08-20 RX ADMIN — BUDESONIDE AND FORMOTEROL FUMARATE DIHYDRATE 2 PUFF: 160; 4.5 AEROSOL RESPIRATORY (INHALATION) at 21:01

## 2023-08-20 SDOH — SOCIAL STABILITY: SOCIAL NETWORK: SUPPORT SYSTEMS: CHILDREN;FAMILY MEMBERS;SPOUSE

## 2023-08-20 SDOH — ECONOMIC STABILITY: HOUSING INSECURITY: ARE YOU WORRIED ABOUT LOSING YOUR HOUSING?: NO

## 2023-08-20 SDOH — ECONOMIC STABILITY: TRANSPORTATION INSECURITY
IN THE PAST 12 MONTHS, HAS LACK OF TRANSPORTATION KEPT YOU FROM MEETINGS, WORK, OR FROM GETTING THINGS NEEDED FOR DAILY LIVING?: NO

## 2023-08-20 SDOH — HEALTH STABILITY: PHYSICAL HEALTH: DO YOU HAVE SERIOUS DIFFICULTY WALKING OR CLIMBING STAIRS?: NO

## 2023-08-20 SDOH — ECONOMIC STABILITY: FOOD INSECURITY: HOW OFTEN IN THE PAST 12 MONTHS WERE YOU WORRIED OR STRESSED ABOUT HAVING ENOUGH MONEY TO BUY NUTRITIOUS MEALS?: NEVER

## 2023-08-20 SDOH — ECONOMIC STABILITY: HOUSING INSECURITY: WHAT IS YOUR LIVING SITUATION TODAY?: SPOUSE

## 2023-08-20 SDOH — SOCIAL STABILITY: SOCIAL NETWORK
HOW OFTEN DO YOU SEE OR TALK TO PEOPLE THAT YOU CARE ABOUT AND FEEL CLOSE TO? (FOR EXAMPLE: TALKING TO FRIENDS ON THE PHONE, VISITING FRIENDS OR FAMILY, GOING TO CHURCH OR CLUB MEETINGS): 3 TO 5 TIMES A WEEK

## 2023-08-20 SDOH — ECONOMIC STABILITY: TRANSPORTATION INSECURITY
IN THE PAST 12 MONTHS, HAS THE LACK OF TRANSPORTATION KEPT YOU FROM MEDICAL APPOINTMENTS OR FROM GETTING MEDICATIONS?: NO

## 2023-08-20 SDOH — HEALTH STABILITY: PHYSICAL HEALTH: DO YOU HAVE DIFFICULTY DRESSING OR BATHING?: NO

## 2023-08-20 SDOH — HEALTH STABILITY: GENERAL
BECAUSE OF A PHYSICAL, MENTAL, OR EMOTIONAL CONDITION, DO YOU HAVE SERIOUS DIFFICULTY CONCENTRATING, REMEMBERING OR MAKING DECISIONS?: NO

## 2023-08-20 SDOH — HEALTH STABILITY: GENERAL: BECAUSE OF A PHYSICAL, MENTAL, OR EMOTIONAL CONDITION, DO YOU HAVE DIFFICULTY DOING ERRANDS ALONE?: NO

## 2023-08-20 SDOH — ECONOMIC STABILITY: HOUSING INSECURITY: WHAT IS YOUR LIVING SITUATION TODAY?: HOUSE

## 2023-08-20 SDOH — ECONOMIC STABILITY: GENERAL

## 2023-08-20 ASSESSMENT — PAIN SCALES - GENERAL
PAINLEVEL_OUTOF10: 3
PAINLEVEL_OUTOF10: 0
PAINLEVEL_OUTOF10: 3

## 2023-08-20 ASSESSMENT — ORIENTATION MEMORY CONCENTRATION TEST (OMCT)
SAY THE MONTHS IN REVERSE ORDER STARTING WITH LAST MONTH: CORRECT
WHAT TIME IS IT (NO WATCH OR CLOCK): CORRECT
COUNT BACKWARDS FROM 20 TO 1: CORRECT
OMCT INTERPRETATION: 0-6: NO SIGNIFICANT IMPAIRMENT
REPEAT THE NAME AND ADDRESS I ASKED YOU TO REMEMBER: 1 ERROR
WHAT MONTH IS IT NOW: CORRECT
WHAT YEAR IS IT NOW (MUST BE EXACT): CORRECT
OMCT SCORE: 2

## 2023-08-20 ASSESSMENT — COLUMBIA-SUICIDE SEVERITY RATING SCALE - C-SSRS
6. HAVE YOU EVER DONE ANYTHING, STARTED TO DO ANYTHING, OR PREPARED TO DO ANYTHING TO END YOUR LIFE?: NO
2. HAVE YOU ACTUALLY HAD ANY THOUGHTS OF KILLING YOURSELF?: NO
1. WITHIN THE PAST MONTH, HAVE YOU WISHED YOU WERE DEAD OR WISHED YOU COULD GO TO SLEEP AND NOT WAKE UP?: NO
IS THE PATIENT ABLE TO COMPLETE C-SSRS: YES

## 2023-08-20 ASSESSMENT — ACTIVITIES OF DAILY LIVING (ADL)
ADL_SHORT_OF_BREATH: NO
ADL_SCORE: 12
ADL_BEFORE_ADMISSION: INDEPENDENT
RECENT_DECLINE_ADL: NO

## 2023-08-20 ASSESSMENT — PATIENT HEALTH QUESTIONNAIRE - PHQ9
CLINICAL INTERPRETATION OF PHQ2 SCORE: NO FURTHER SCREENING NEEDED
2. FEELING DOWN, DEPRESSED OR HOPELESS: NOT AT ALL
1. LITTLE INTEREST OR PLEASURE IN DOING THINGS: NOT AT ALL
SUM OF ALL RESPONSES TO PHQ9 QUESTIONS 1 AND 2: 0
SUM OF ALL RESPONSES TO PHQ9 QUESTIONS 1 AND 2: 0
IS PATIENT ABLE TO COMPLETE PHQ2 OR PHQ9: YES

## 2023-08-20 ASSESSMENT — LIFESTYLE VARIABLES
AUDIT-C TOTAL SCORE: 1
HOW OFTEN DO YOU HAVE 6 OR MORE DRINKS ON ONE OCCASION: NEVER
HOW MANY STANDARD DRINKS CONTAINING ALCOHOL DO YOU HAVE ON A TYPICAL DAY: 0,1 OR 2
ALCOHOL_USE_STATUS: NO OR LOW RISK WITH VALIDATED TOOL
HOW OFTEN DO YOU HAVE A DRINK CONTAINING ALCOHOL: MONTHLY OR LESS

## 2023-08-21 ENCOUNTER — APPOINTMENT (OUTPATIENT)
Dept: GENERAL RADIOLOGY | Age: 73
DRG: 494 | End: 2023-08-21
Attending: ORTHOPAEDIC SURGERY

## 2023-08-21 ENCOUNTER — ANESTHESIA (OUTPATIENT)
Dept: SURGERY | Age: 73
DRG: 494 | End: 2023-08-21

## 2023-08-21 ENCOUNTER — ANESTHESIA EVENT (OUTPATIENT)
Dept: SURGERY | Age: 73
DRG: 494 | End: 2023-08-21

## 2023-08-21 LAB
ABO + RH BLD: NORMAL
ANION GAP SERPL CALC-SCNC: 8 MMOL/L (ref 7–19)
BLD GP AB SCN SERPL QL GEL: NEGATIVE
BUN SERPL-MCNC: 14 MG/DL (ref 6–20)
BUN/CREAT SERPL: 26 (ref 7–25)
CALCIUM SERPL-MCNC: 8.9 MG/DL (ref 8.4–10.2)
CHLORIDE SERPL-SCNC: 101 MMOL/L (ref 97–110)
CO2 SERPL-SCNC: 31 MMOL/L (ref 21–32)
CREAT SERPL-MCNC: 0.53 MG/DL (ref 0.51–0.95)
DEPRECATED RDW RBC: 45.7 FL (ref 39–50)
EGFRCR SERPLBLD CKD-EPI 2021: >90 ML/MIN/{1.73_M2}
ERYTHROCYTE [DISTWIDTH] IN BLOOD: 13 % (ref 11–15)
FASTING DURATION TIME PATIENT: ABNORMAL H
GLUCOSE SERPL-MCNC: 108 MG/DL (ref 70–99)
HCT VFR BLD CALC: 37 % (ref 36–46.5)
HGB BLD-MCNC: 12.4 G/DL (ref 12–15.5)
INR PPP: 1
MAGNESIUM SERPL-MCNC: 1.9 MG/DL (ref 1.7–2.4)
MCH RBC QN AUTO: 32 PG (ref 26–34)
MCHC RBC AUTO-ENTMCNC: 33.5 G/DL (ref 32–36.5)
MCV RBC AUTO: 95.6 FL (ref 78–100)
NRBC BLD MANUAL-RTO: 0 /100 WBC
PLATELET # BLD AUTO: 225 K/MCL (ref 140–450)
POTASSIUM SERPL-SCNC: 3.7 MMOL/L (ref 3.4–5.1)
PROTHROMBIN TIME: 10.1 SEC (ref 9.7–11.8)
RBC # BLD: 3.87 MIL/MCL (ref 4–5.2)
SODIUM SERPL-SCNC: 136 MMOL/L (ref 135–145)
TYPE AND SCREEN EXPIRATION DATE: NORMAL
WBC # BLD: 10.1 K/MCL (ref 4.2–11)

## 2023-08-21 PROCEDURE — C1769 GUIDE WIRE: HCPCS | Performed by: ORTHOPAEDIC SURGERY

## 2023-08-21 PROCEDURE — 0QSG04Z REPOSITION RIGHT TIBIA WITH INTERNAL FIXATION DEVICE, OPEN APPROACH: ICD-10-PCS | Performed by: ORTHOPAEDIC SURGERY

## 2023-08-21 PROCEDURE — 13000003 HB ANESTHESIA  GENERAL EA ADD MINUTE: Performed by: ORTHOPAEDIC SURGERY

## 2023-08-21 PROCEDURE — 86901 BLOOD TYPING SEROLOGIC RH(D): CPT | Performed by: ORTHOPAEDIC SURGERY

## 2023-08-21 PROCEDURE — 10002800 HB RX 250 W HCPCS: Performed by: GENERAL ACUTE CARE HOSPITAL

## 2023-08-21 PROCEDURE — 13000116 HB ORTHO COMPLEX CASE S/U + 1ST 15 MIN: Performed by: ORTHOPAEDIC SURGERY

## 2023-08-21 PROCEDURE — 10006027 HB SUPPLY 278: Performed by: ORTHOPAEDIC SURGERY

## 2023-08-21 PROCEDURE — 83735 ASSAY OF MAGNESIUM: CPT | Performed by: INTERNAL MEDICINE

## 2023-08-21 PROCEDURE — 94640 AIRWAY INHALATION TREATMENT: CPT

## 2023-08-21 PROCEDURE — 13000117 HB ORTHO COMPLEX CASE EA ADD MINUTE: Performed by: ORTHOPAEDIC SURGERY

## 2023-08-21 PROCEDURE — 10006023 HB SUPPLY 272: Performed by: ORTHOPAEDIC SURGERY

## 2023-08-21 PROCEDURE — 99232 SBSQ HOSP IP/OBS MODERATE 35: CPT | Performed by: INTERNAL MEDICINE

## 2023-08-21 PROCEDURE — 10004180 HB COUNTER-TRANSPORT

## 2023-08-21 PROCEDURE — 10002801 HB RX 250 W/O HCPCS: Performed by: GENERAL ACUTE CARE HOSPITAL

## 2023-08-21 PROCEDURE — 10002803 HB RX 637: Performed by: ORTHOPAEDIC SURGERY

## 2023-08-21 PROCEDURE — 10004452 HB PACU ADDL 30 MINUTES: Performed by: ORTHOPAEDIC SURGERY

## 2023-08-21 PROCEDURE — 10004651 HB RX, NO CHARGE ITEM: Performed by: ORTHOPAEDIC SURGERY

## 2023-08-21 PROCEDURE — 10002800 HB RX 250 W HCPCS: Performed by: ORTHOPAEDIC SURGERY

## 2023-08-21 PROCEDURE — C1713 ANCHOR/SCREW BN/BN,TIS/BN: HCPCS | Performed by: ORTHOPAEDIC SURGERY

## 2023-08-21 PROCEDURE — 80048 BASIC METABOLIC PNL TOTAL CA: CPT | Performed by: INTERNAL MEDICINE

## 2023-08-21 PROCEDURE — 85610 PROTHROMBIN TIME: CPT | Performed by: INTERNAL MEDICINE

## 2023-08-21 PROCEDURE — 10005281 FL INTRAOPERATIVE C ARM WITH REPORT

## 2023-08-21 PROCEDURE — 0S9C3ZX DRAINAGE OF RIGHT KNEE JOINT, PERCUTANEOUS APPROACH, DIAGNOSTIC: ICD-10-PCS | Performed by: ORTHOPAEDIC SURGERY

## 2023-08-21 PROCEDURE — 13000002 HB ANESTHESIA  GENERAL  S/U + 1ST 15 MIN: Performed by: ORTHOPAEDIC SURGERY

## 2023-08-21 PROCEDURE — 10000002 HB ROOM CHARGE MED SURG

## 2023-08-21 PROCEDURE — 85027 COMPLETE CBC AUTOMATED: CPT | Performed by: INTERNAL MEDICINE

## 2023-08-21 PROCEDURE — 13003289 HB OXYGEN THERAPY DAILY

## 2023-08-21 PROCEDURE — 10004451 HB PACU RECOVERY 1ST 30 MINUTES: Performed by: ORTHOPAEDIC SURGERY

## 2023-08-21 PROCEDURE — 10002803 HB RX 637: Performed by: INTERNAL MEDICINE

## 2023-08-21 PROCEDURE — 10004651 HB RX, NO CHARGE ITEM: Performed by: INTERNAL MEDICINE

## 2023-08-21 PROCEDURE — 10002807 HB RX 258: Performed by: GENERAL ACUTE CARE HOSPITAL

## 2023-08-21 PROCEDURE — 36415 COLL VENOUS BLD VENIPUNCTURE: CPT | Performed by: INTERNAL MEDICINE

## 2023-08-21 PROCEDURE — 10002801 HB RX 250 W/O HCPCS: Performed by: ORTHOPAEDIC SURGERY

## 2023-08-21 DEVICE — WASHER 7MM ORTHO SS NS: Type: IMPLANTABLE DEVICE | Site: LEG LOWER | Status: FUNCTIONAL

## 2023-08-21 DEVICE — SCREW BN 7.3MM 8.2MM 2.9MM 75MM 16MM RVRS CUT FLUTE: Type: IMPLANTABLE DEVICE | Site: LEG LOWER | Status: FUNCTIONAL

## 2023-08-21 DEVICE — IMPLANTABLE DEVICE: Type: IMPLANTABLE DEVICE | Site: LEG LOWER | Status: FUNCTIONAL

## 2023-08-21 DEVICE — SCREW BN 4MM 5MM 1.35MM 42MM .5 THRD RVRS CUT FLUTE: Type: IMPLANTABLE DEVICE | Site: LEG LOWER | Status: FUNCTIONAL

## 2023-08-21 DEVICE — SCREW BN 4MM 5MM 1.35MM 48MM .5 THRD RVRS CUT FLUTE: Type: IMPLANTABLE DEVICE | Site: LEG LOWER | Status: FUNCTIONAL

## 2023-08-21 RX ORDER — BISACODYL 10 MG
10 SUPPOSITORY, RECTAL RECTAL DAILY PRN
Status: DISCONTINUED | OUTPATIENT
Start: 2023-08-21 | End: 2023-08-23 | Stop reason: HOSPADM

## 2023-08-21 RX ORDER — PROPOFOL 10 MG/ML
INJECTION, EMULSION INTRAVENOUS PRN
Status: DISCONTINUED | OUTPATIENT
Start: 2023-08-21 | End: 2023-08-21

## 2023-08-21 RX ORDER — SODIUM CHLORIDE, SODIUM LACTATE, POTASSIUM CHLORIDE, CALCIUM CHLORIDE 600; 310; 30; 20 MG/100ML; MG/100ML; MG/100ML; MG/100ML
INJECTION, SOLUTION INTRAVENOUS CONTINUOUS
Status: DISCONTINUED | OUTPATIENT
Start: 2023-08-21 | End: 2023-08-22

## 2023-08-21 RX ORDER — EPHEDRINE SULFATE/0.9% NACL/PF 50 MG/10ML
SYRINGE (ML) INTRAVENOUS PRN
Status: DISCONTINUED | OUTPATIENT
Start: 2023-08-21 | End: 2023-08-21

## 2023-08-21 RX ORDER — BUPIVACAINE HYDROCHLORIDE AND EPINEPHRINE 2.5; 5 MG/ML; UG/ML
INJECTION, SOLUTION EPIDURAL; INFILTRATION; INTRACAUDAL; PERINEURAL PRN
Status: DISCONTINUED | OUTPATIENT
Start: 2023-08-21 | End: 2023-08-21 | Stop reason: HOSPADM

## 2023-08-21 RX ORDER — ONDANSETRON 2 MG/ML
INJECTION INTRAMUSCULAR; INTRAVENOUS PRN
Status: DISCONTINUED | OUTPATIENT
Start: 2023-08-21 | End: 2023-08-21

## 2023-08-21 RX ORDER — OXYCODONE HYDROCHLORIDE 5 MG/1
5 TABLET ORAL EVERY 4 HOURS PRN
Status: DISCONTINUED | OUTPATIENT
Start: 2023-08-21 | End: 2023-08-23 | Stop reason: HOSPADM

## 2023-08-21 RX ORDER — HUMAN INSULIN 100 [IU]/ML
INJECTION, SOLUTION SUBCUTANEOUS
Status: DISCONTINUED | OUTPATIENT
Start: 2023-08-21 | End: 2023-08-21 | Stop reason: HOSPADM

## 2023-08-21 RX ORDER — ALBUTEROL SULFATE 2.5 MG/3ML
2.5 SOLUTION RESPIRATORY (INHALATION)
Status: DISCONTINUED | OUTPATIENT
Start: 2023-08-21 | End: 2023-08-21 | Stop reason: HOSPADM

## 2023-08-21 RX ORDER — GLYCOPYRROLATE 0.2 MG/ML
INJECTION, SOLUTION INTRAMUSCULAR; INTRAVENOUS PRN
Status: DISCONTINUED | OUTPATIENT
Start: 2023-08-21 | End: 2023-08-21

## 2023-08-21 RX ORDER — DEXAMETHASONE SODIUM PHOSPHATE 4 MG/ML
INJECTION, SOLUTION INTRA-ARTICULAR; INTRALESIONAL; INTRAMUSCULAR; INTRAVENOUS; SOFT TISSUE PRN
Status: DISCONTINUED | OUTPATIENT
Start: 2023-08-21 | End: 2023-08-21

## 2023-08-21 RX ORDER — ONDANSETRON 4 MG/1
4 TABLET, ORALLY DISINTEGRATING ORAL
Status: DISCONTINUED | OUTPATIENT
Start: 2023-08-21 | End: 2023-08-21 | Stop reason: SDUPTHER

## 2023-08-21 RX ORDER — HYDRALAZINE HYDROCHLORIDE 20 MG/ML
5 INJECTION INTRAMUSCULAR; INTRAVENOUS EVERY 10 MIN PRN
Status: DISCONTINUED | OUTPATIENT
Start: 2023-08-21 | End: 2023-08-21 | Stop reason: HOSPADM

## 2023-08-21 RX ORDER — DEXAMETHASONE SODIUM PHOSPHATE 4 MG/ML
4 INJECTION, SOLUTION INTRA-ARTICULAR; INTRALESIONAL; INTRAMUSCULAR; INTRAVENOUS; SOFT TISSUE
Status: DISCONTINUED | OUTPATIENT
Start: 2023-08-21 | End: 2023-08-21 | Stop reason: HOSPADM

## 2023-08-21 RX ORDER — METOCLOPRAMIDE HYDROCHLORIDE 5 MG/ML
5 INJECTION INTRAMUSCULAR; INTRAVENOUS
Status: ACTIVE | OUTPATIENT
Start: 2023-08-21 | End: 2023-08-21

## 2023-08-21 RX ORDER — MIDAZOLAM HYDROCHLORIDE 1 MG/ML
INJECTION, SOLUTION INTRAMUSCULAR; INTRAVENOUS PRN
Status: DISCONTINUED | OUTPATIENT
Start: 2023-08-21 | End: 2023-08-21

## 2023-08-21 RX ORDER — POTASSIUM CHLORIDE 20 MEQ/1
40 TABLET, EXTENDED RELEASE ORAL ONCE
Status: COMPLETED | OUTPATIENT
Start: 2023-08-21 | End: 2023-08-21

## 2023-08-21 RX ORDER — NEOSTIGMINE METHYLSULFATE 4 MG/4 ML
SYRINGE (ML) INTRAVENOUS PRN
Status: DISCONTINUED | OUTPATIENT
Start: 2023-08-21 | End: 2023-08-21

## 2023-08-21 RX ORDER — METOCLOPRAMIDE HYDROCHLORIDE 5 MG/ML
5 INJECTION INTRAMUSCULAR; INTRAVENOUS EVERY 6 HOURS PRN
Status: DISCONTINUED | OUTPATIENT
Start: 2023-08-21 | End: 2023-08-21 | Stop reason: HOSPADM

## 2023-08-21 RX ORDER — ROCURONIUM BROMIDE 10 MG/ML
INJECTION, SOLUTION INTRAVENOUS PRN
Status: DISCONTINUED | OUTPATIENT
Start: 2023-08-21 | End: 2023-08-21

## 2023-08-21 RX ORDER — PROCHLORPERAZINE EDISYLATE 5 MG/ML
5 INJECTION INTRAMUSCULAR; INTRAVENOUS EVERY 4 HOURS PRN
Status: DISCONTINUED | OUTPATIENT
Start: 2023-08-21 | End: 2023-08-21 | Stop reason: HOSPADM

## 2023-08-21 RX ORDER — GABAPENTIN 300 MG/1
300 CAPSULE ORAL 3 TIMES DAILY
Status: DISCONTINUED | OUTPATIENT
Start: 2023-08-21 | End: 2023-08-23 | Stop reason: HOSPADM

## 2023-08-21 RX ORDER — ACETAMINOPHEN 325 MG/1
650 TABLET ORAL
Status: DISCONTINUED | OUTPATIENT
Start: 2023-08-21 | End: 2023-08-21

## 2023-08-21 RX ORDER — ENOXAPARIN SODIUM 100 MG/ML
40 INJECTION SUBCUTANEOUS DAILY
Status: DISCONTINUED | OUTPATIENT
Start: 2023-08-22 | End: 2023-08-23 | Stop reason: HOSPADM

## 2023-08-21 RX ORDER — HYDROCODONE BITARTRATE AND ACETAMINOPHEN 5; 325 MG/1; MG/1
1 TABLET ORAL
Status: DISCONTINUED | OUTPATIENT
Start: 2023-08-21 | End: 2023-08-21 | Stop reason: HOSPADM

## 2023-08-21 RX ORDER — AMOXICILLIN 250 MG
2 CAPSULE ORAL 2 TIMES DAILY
Status: DISCONTINUED | OUTPATIENT
Start: 2023-08-21 | End: 2023-08-23 | Stop reason: HOSPADM

## 2023-08-21 RX ORDER — ONDANSETRON 2 MG/ML
4 INJECTION INTRAMUSCULAR; INTRAVENOUS
Status: DISCONTINUED | OUTPATIENT
Start: 2023-08-21 | End: 2023-08-21 | Stop reason: SDUPTHER

## 2023-08-21 RX ORDER — SODIUM CHLORIDE, SODIUM LACTATE, POTASSIUM CHLORIDE, CALCIUM CHLORIDE 600; 310; 30; 20 MG/100ML; MG/100ML; MG/100ML; MG/100ML
INJECTION, SOLUTION INTRAVENOUS CONTINUOUS PRN
Status: DISCONTINUED | OUTPATIENT
Start: 2023-08-21 | End: 2023-08-21

## 2023-08-21 RX ORDER — ONDANSETRON 2 MG/ML
4 INJECTION INTRAMUSCULAR; INTRAVENOUS 2 TIMES DAILY PRN
Status: DISCONTINUED | OUTPATIENT
Start: 2023-08-21 | End: 2023-08-21 | Stop reason: HOSPADM

## 2023-08-21 RX ORDER — ACETAMINOPHEN 500 MG
1000 TABLET ORAL 3 TIMES DAILY
Status: DISCONTINUED | OUTPATIENT
Start: 2023-08-21 | End: 2023-08-23 | Stop reason: HOSPADM

## 2023-08-21 RX ADMIN — TRAMADOL HYDROCHLORIDE 50 MG: 50 TABLET, COATED ORAL at 13:54

## 2023-08-21 RX ADMIN — EPHEDRINE SULFATE 10 MG: 5 INJECTION INTRAVENOUS at 16:36

## 2023-08-21 RX ADMIN — DEXAMETHASONE SODIUM PHOSPHATE 8 MG: 4 INJECTION, SOLUTION INTRAMUSCULAR; INTRAVENOUS at 16:18

## 2023-08-21 RX ADMIN — CEFAZOLIN SODIUM 2000 MG: 300 INJECTION, POWDER, LYOPHILIZED, FOR SOLUTION INTRAVENOUS at 23:28

## 2023-08-21 RX ADMIN — FAMOTIDINE 20 MG: 20 TABLET, FILM COATED ORAL at 21:23

## 2023-08-21 RX ADMIN — SODIUM CHLORIDE, PRESERVATIVE FREE 2 ML: 5 INJECTION INTRAVENOUS at 08:50

## 2023-08-21 RX ADMIN — Medication 4 MG: at 18:20

## 2023-08-21 RX ADMIN — ACETAMINOPHEN 1000 MG: 325 TABLET ORAL at 13:54

## 2023-08-21 RX ADMIN — SODIUM CHLORIDE, POTASSIUM CHLORIDE, SODIUM LACTATE AND CALCIUM CHLORIDE: 600; 310; 30; 20 INJECTION, SOLUTION INTRAVENOUS at 18:05

## 2023-08-21 RX ADMIN — Medication 25 MCG: at 08:48

## 2023-08-21 RX ADMIN — ONDANSETRON 4 MG: 2 INJECTION INTRAMUSCULAR; INTRAVENOUS at 18:05

## 2023-08-21 RX ADMIN — BRIMONIDINE TARTRATE 1 DROP: 2 SOLUTION OPHTHALMIC at 09:00

## 2023-08-21 RX ADMIN — POTASSIUM CHLORIDE 40 MEQ: 1500 TABLET, EXTENDED RELEASE ORAL at 08:48

## 2023-08-21 RX ADMIN — CEFAZOLIN SODIUM 2000 MG: 300 INJECTION, POWDER, LYOPHILIZED, FOR SOLUTION INTRAVENOUS at 16:24

## 2023-08-21 RX ADMIN — GABAPENTIN 300 MG: 300 CAPSULE ORAL at 21:23

## 2023-08-21 RX ADMIN — GABAPENTIN 300 MG: 300 CAPSULE ORAL at 13:54

## 2023-08-21 RX ADMIN — MIDAZOLAM HYDROCHLORIDE 2 MG: 1 INJECTION, SOLUTION INTRAMUSCULAR; INTRAVENOUS at 16:11

## 2023-08-21 RX ADMIN — PROPOFOL 150 MG: 10 INJECTION, EMULSION INTRAVENOUS at 16:15

## 2023-08-21 RX ADMIN — SENNOSIDES AND DOCUSATE SODIUM 2 TABLET: 50; 8.6 TABLET ORAL at 23:28

## 2023-08-21 RX ADMIN — ROCURONIUM BROMIDE 50 MG: 10 INJECTION, SOLUTION INTRAVENOUS at 16:15

## 2023-08-21 RX ADMIN — BUDESONIDE AND FORMOTEROL FUMARATE DIHYDRATE 2 PUFF: 160; 4.5 AEROSOL RESPIRATORY (INHALATION) at 08:25

## 2023-08-21 RX ADMIN — BRIMONIDINE TARTRATE 1 DROP: 2 SOLUTION OPHTHALMIC at 21:24

## 2023-08-21 RX ADMIN — SODIUM CHLORIDE, POTASSIUM CHLORIDE, SODIUM LACTATE AND CALCIUM CHLORIDE: 600; 310; 30; 20 INJECTION, SOLUTION INTRAVENOUS at 15:56

## 2023-08-21 RX ADMIN — ACETAMINOPHEN 650 MG: 325 TABLET ORAL at 08:48

## 2023-08-21 RX ADMIN — LATANOPROST 1 DROP: 50 SOLUTION/ DROPS OPHTHALMIC at 08:51

## 2023-08-21 RX ADMIN — TRAMADOL HYDROCHLORIDE 50 MG: 50 TABLET, COATED ORAL at 05:17

## 2023-08-21 RX ADMIN — ACETAMINOPHEN 1000 MG: 325 TABLET ORAL at 21:23

## 2023-08-21 RX ADMIN — GLYCOPYRROLATE 0.8 MG: 0.2 INJECTION, SOLUTION INTRAMUSCULAR; INTRAVENOUS at 18:20

## 2023-08-21 RX ADMIN — UMECLIDINIUM 1 PUFF: 62.5 AEROSOL, POWDER ORAL at 08:24

## 2023-08-21 ASSESSMENT — PAIN SCALES - GENERAL
PAINLEVEL_OUTOF10: 4
PAINLEVEL_OUTOF10: 1
PAINLEVEL_OUTOF10: 0
PAINLEVEL_OUTOF10: 6
PAINLEVEL_OUTOF10: 0
PAINLEVEL_OUTOF10: 0
PAINLEVEL_OUTOF10: 6
PAINLEVEL_OUTOF10: 0
PAINLEVEL_OUTOF10: 4
PAINLEVEL_OUTOF10: 0
PAINLEVEL_OUTOF10: 3
PAINLEVEL_OUTOF10: 0

## 2023-08-21 ASSESSMENT — COPD QUESTIONNAIRES: COPD: 1

## 2023-08-21 ASSESSMENT — COGNITIVE AND FUNCTIONAL STATUS - GENERAL: DO YOU HAVE SERIOUS DIFFICULTY WALKING OR CLIMBING STAIRS: NO

## 2023-08-22 LAB
BASOPHILS # BLD: 0 K/MCL (ref 0–0.3)
BASOPHILS NFR BLD: 0 %
DEPRECATED RDW RBC: 45.8 FL (ref 39–50)
EOSINOPHIL # BLD: 0 K/MCL (ref 0–0.5)
EOSINOPHIL NFR BLD: 0 %
ERYTHROCYTE [DISTWIDTH] IN BLOOD: 12.7 % (ref 11–15)
HCT VFR BLD CALC: 35.9 % (ref 36–46.5)
HGB BLD-MCNC: 11.9 G/DL (ref 12–15.5)
IMM GRANULOCYTES # BLD AUTO: 0.1 K/MCL (ref 0–0.2)
IMM GRANULOCYTES # BLD: 1 %
LYMPHOCYTES # BLD: 0.2 K/MCL (ref 1–4)
LYMPHOCYTES NFR BLD: 2 %
MCH RBC QN AUTO: 32.2 PG (ref 26–34)
MCHC RBC AUTO-ENTMCNC: 33.1 G/DL (ref 32–36.5)
MCV RBC AUTO: 97.3 FL (ref 78–100)
MONOCYTES # BLD: 0.5 K/MCL (ref 0.3–0.9)
MONOCYTES NFR BLD: 5 %
NEUTROPHILS # BLD: 8.6 K/MCL (ref 1.8–7.7)
NEUTROPHILS NFR BLD: 92 %
NRBC BLD MANUAL-RTO: 0 /100 WBC
PLATELET # BLD AUTO: 252 K/MCL (ref 140–450)
POTASSIUM SERPL-SCNC: 4.6 MMOL/L (ref 3.4–5.1)
RBC # BLD: 3.69 MIL/MCL (ref 4–5.2)
WBC # BLD: 9.3 K/MCL (ref 4.2–11)

## 2023-08-22 PROCEDURE — 10000002 HB ROOM CHARGE MED SURG

## 2023-08-22 PROCEDURE — 36415 COLL VENOUS BLD VENIPUNCTURE: CPT | Performed by: INTERNAL MEDICINE

## 2023-08-22 PROCEDURE — 97161 PT EVAL LOW COMPLEX 20 MIN: CPT

## 2023-08-22 PROCEDURE — 10004651 HB RX, NO CHARGE ITEM: Performed by: INTERNAL MEDICINE

## 2023-08-22 PROCEDURE — 97530 THERAPEUTIC ACTIVITIES: CPT

## 2023-08-22 PROCEDURE — 96372 THER/PROPH/DIAG INJ SC/IM: CPT | Performed by: ORTHOPAEDIC SURGERY

## 2023-08-22 PROCEDURE — 94640 AIRWAY INHALATION TREATMENT: CPT

## 2023-08-22 PROCEDURE — 99233 SBSQ HOSP IP/OBS HIGH 50: CPT | Performed by: INTERNAL MEDICINE

## 2023-08-22 PROCEDURE — 97166 OT EVAL MOD COMPLEX 45 MIN: CPT

## 2023-08-22 PROCEDURE — 13003289 HB OXYGEN THERAPY DAILY

## 2023-08-22 PROCEDURE — 10002803 HB RX 637: Performed by: ORTHOPAEDIC SURGERY

## 2023-08-22 PROCEDURE — 10002800 HB RX 250 W HCPCS: Performed by: ORTHOPAEDIC SURGERY

## 2023-08-22 PROCEDURE — 84132 ASSAY OF SERUM POTASSIUM: CPT | Performed by: INTERNAL MEDICINE

## 2023-08-22 PROCEDURE — 85025 COMPLETE CBC W/AUTO DIFF WBC: CPT | Performed by: INTERNAL MEDICINE

## 2023-08-22 PROCEDURE — 10004180 HB COUNTER-TRANSPORT

## 2023-08-22 PROCEDURE — 10004651 HB RX, NO CHARGE ITEM: Performed by: ORTHOPAEDIC SURGERY

## 2023-08-22 PROCEDURE — 97535 SELF CARE MNGMENT TRAINING: CPT

## 2023-08-22 PROCEDURE — 10002803 HB RX 637: Performed by: INTERNAL MEDICINE

## 2023-08-22 RX ADMIN — BRIMONIDINE TARTRATE 1 DROP: 2 SOLUTION OPHTHALMIC at 08:05

## 2023-08-22 RX ADMIN — GABAPENTIN 300 MG: 300 CAPSULE ORAL at 20:24

## 2023-08-22 RX ADMIN — ACETAMINOPHEN 1000 MG: 325 TABLET ORAL at 20:24

## 2023-08-22 RX ADMIN — ENOXAPARIN SODIUM 40 MG: 40 INJECTION SUBCUTANEOUS at 06:22

## 2023-08-22 RX ADMIN — BUDESONIDE AND FORMOTEROL FUMARATE DIHYDRATE 2 PUFF: 160; 4.5 AEROSOL RESPIRATORY (INHALATION) at 20:32

## 2023-08-22 RX ADMIN — GABAPENTIN 300 MG: 300 CAPSULE ORAL at 08:05

## 2023-08-22 RX ADMIN — SODIUM CHLORIDE, PRESERVATIVE FREE 2 ML: 5 INJECTION INTRAVENOUS at 08:07

## 2023-08-22 RX ADMIN — ACETAMINOPHEN 1000 MG: 325 TABLET ORAL at 12:47

## 2023-08-22 RX ADMIN — BUDESONIDE AND FORMOTEROL FUMARATE DIHYDRATE 2 PUFF: 160; 4.5 AEROSOL RESPIRATORY (INHALATION) at 07:50

## 2023-08-22 RX ADMIN — GABAPENTIN 300 MG: 300 CAPSULE ORAL at 13:00

## 2023-08-22 RX ADMIN — UMECLIDINIUM 1 PUFF: 62.5 AEROSOL, POWDER ORAL at 07:50

## 2023-08-22 RX ADMIN — Medication 25 MCG: at 08:05

## 2023-08-22 RX ADMIN — FAMOTIDINE 20 MG: 20 TABLET, FILM COATED ORAL at 20:24

## 2023-08-22 RX ADMIN — SENNOSIDES AND DOCUSATE SODIUM 2 TABLET: 50; 8.6 TABLET ORAL at 08:05

## 2023-08-22 RX ADMIN — BRIMONIDINE TARTRATE 1 DROP: 2 SOLUTION OPHTHALMIC at 21:52

## 2023-08-22 RX ADMIN — LATANOPROST 1 DROP: 50 SOLUTION/ DROPS OPHTHALMIC at 08:05

## 2023-08-22 RX ADMIN — SODIUM CHLORIDE, PRESERVATIVE FREE 2 ML: 5 INJECTION INTRAVENOUS at 20:24

## 2023-08-22 RX ADMIN — CEFAZOLIN SODIUM 2000 MG: 300 INJECTION, POWDER, LYOPHILIZED, FOR SOLUTION INTRAVENOUS at 06:21

## 2023-08-22 RX ADMIN — SENNOSIDES AND DOCUSATE SODIUM 2 TABLET: 50; 8.6 TABLET ORAL at 20:24

## 2023-08-22 RX ADMIN — ACETAMINOPHEN 1000 MG: 325 TABLET ORAL at 08:04

## 2023-08-22 ASSESSMENT — PAIN SCALES - GENERAL: PAINLEVEL_OUTOF10: 2

## 2023-08-22 ASSESSMENT — COGNITIVE AND FUNCTIONAL STATUS - GENERAL
HELP NEEDED DRESSING REGULAR LOWER BODY CLOTHING: A LOT
BASIC_MOBILITY_CONVERTED_SCORE: 41.05
BASIC_MOBILITY_RAW_SCORE: 18
HELP NEEDED FOR TOILETING: A LITTLE
DAILY_ACTIVITY_CONVERTED_SCORE: 42.03
HELP NEEDED FOR BATHING: A LITTLE
DAILY_ACTIVITY_RAW_SCORE: 20

## 2023-08-22 ASSESSMENT — ACTIVITIES OF DAILY LIVING (ADL)
PRIOR_ADL_BATHING: INDEPENDENT
HOME_MANAGEMENT_TIME_ENTRY: 12
PRIOR_ADL_TOILETING: INDEPENDENT
PRIOR_ADL: INDEPENDENT
EATING: INDEPENDENT
GROOMING: INDEPENDENT

## 2023-08-22 ASSESSMENT — ENCOUNTER SYMPTOMS: PAIN SEVERITY NOW: 2

## 2023-08-23 VITALS
OXYGEN SATURATION: 90 % | HEART RATE: 62 BPM | SYSTOLIC BLOOD PRESSURE: 125 MMHG | BODY MASS INDEX: 29.05 KG/M2 | WEIGHT: 180.78 LBS | DIASTOLIC BLOOD PRESSURE: 80 MMHG | RESPIRATION RATE: 16 BRPM | TEMPERATURE: 97.3 F | HEIGHT: 66 IN

## 2023-08-23 PROCEDURE — 99239 HOSP IP/OBS DSCHRG MGMT >30: CPT | Performed by: INTERNAL MEDICINE

## 2023-08-23 PROCEDURE — 10002803 HB RX 637: Performed by: ORTHOPAEDIC SURGERY

## 2023-08-23 PROCEDURE — 10002803 HB RX 637: Performed by: INTERNAL MEDICINE

## 2023-08-23 PROCEDURE — 10002800 HB RX 250 W HCPCS: Performed by: ORTHOPAEDIC SURGERY

## 2023-08-23 PROCEDURE — 96372 THER/PROPH/DIAG INJ SC/IM: CPT | Performed by: ORTHOPAEDIC SURGERY

## 2023-08-23 PROCEDURE — 10004651 HB RX, NO CHARGE ITEM: Performed by: INTERNAL MEDICINE

## 2023-08-23 PROCEDURE — 10004651 HB RX, NO CHARGE ITEM: Performed by: ORTHOPAEDIC SURGERY

## 2023-08-23 PROCEDURE — 97530 THERAPEUTIC ACTIVITIES: CPT

## 2023-08-23 PROCEDURE — 97116 GAIT TRAINING THERAPY: CPT

## 2023-08-23 RX ORDER — TRAMADOL HYDROCHLORIDE 50 MG/1
50 TABLET ORAL EVERY 6 HOURS PRN
Qty: 20 TABLET | Refills: 0 | Status: SHIPPED | OUTPATIENT
Start: 2023-08-23

## 2023-08-23 RX ORDER — FAMOTIDINE 20 MG/1
20 TABLET, FILM COATED ORAL NIGHTLY
Qty: 14 TABLET | Refills: 0 | Status: SHIPPED | OUTPATIENT
Start: 2023-08-23

## 2023-08-23 RX ORDER — GABAPENTIN 300 MG/1
CAPSULE ORAL
Qty: 30 CAPSULE | Refills: 0 | Status: SHIPPED | OUTPATIENT
Start: 2023-08-23 | End: 2023-09-07

## 2023-08-23 RX ORDER — ACETAMINOPHEN 325 MG/1
650 TABLET ORAL EVERY 4 HOURS PRN
Status: SHIPPED | COMMUNITY
Start: 2023-08-23

## 2023-08-23 RX ORDER — ENOXAPARIN SODIUM 100 MG/ML
40 INJECTION SUBCUTANEOUS DAILY
Qty: 4.8 ML | Refills: 0 | Status: SHIPPED | OUTPATIENT
Start: 2023-08-24 | End: 2023-09-05

## 2023-08-23 RX ADMIN — GABAPENTIN 300 MG: 300 CAPSULE ORAL at 11:55

## 2023-08-23 RX ADMIN — ACETAMINOPHEN 1000 MG: 325 TABLET ORAL at 08:00

## 2023-08-23 RX ADMIN — UMECLIDINIUM 1 PUFF: 62.5 AEROSOL, POWDER ORAL at 08:37

## 2023-08-23 RX ADMIN — BRIMONIDINE TARTRATE 1 DROP: 2 SOLUTION OPHTHALMIC at 09:00

## 2023-08-23 RX ADMIN — SODIUM CHLORIDE, PRESERVATIVE FREE 2 ML: 5 INJECTION INTRAVENOUS at 08:00

## 2023-08-23 RX ADMIN — SENNOSIDES AND DOCUSATE SODIUM 2 TABLET: 50; 8.6 TABLET ORAL at 08:00

## 2023-08-23 RX ADMIN — TRAMADOL HYDROCHLORIDE 50 MG: 50 TABLET, COATED ORAL at 07:43

## 2023-08-23 RX ADMIN — ENOXAPARIN SODIUM 40 MG: 40 INJECTION SUBCUTANEOUS at 08:00

## 2023-08-23 RX ADMIN — ACETAMINOPHEN 1000 MG: 325 TABLET ORAL at 11:55

## 2023-08-23 RX ADMIN — Medication 25 MCG: at 08:00

## 2023-08-23 RX ADMIN — GABAPENTIN 300 MG: 300 CAPSULE ORAL at 08:00

## 2023-08-23 RX ADMIN — BUDESONIDE AND FORMOTEROL FUMARATE DIHYDRATE 2 PUFF: 160; 4.5 AEROSOL RESPIRATORY (INHALATION) at 08:37

## 2023-08-23 ASSESSMENT — COGNITIVE AND FUNCTIONAL STATUS - GENERAL
BASIC_MOBILITY_CONVERTED_SCORE: 41.05
BASIC_MOBILITY_RAW_SCORE: 18

## 2023-08-23 ASSESSMENT — PAIN SCALES - GENERAL: PAINLEVEL_OUTOF10: 4

## 2023-08-23 ASSESSMENT — ENCOUNTER SYMPTOMS: PAIN SEVERITY NOW: 5

## 2023-08-24 ENCOUNTER — MED REC SCAN ONLY (OUTPATIENT)
Dept: FAMILY MEDICINE CLINIC | Facility: CLINIC | Age: 73
End: 2023-08-24

## 2023-08-28 ENCOUNTER — TELEPHONE (OUTPATIENT)
Dept: FAMILY MEDICINE CLINIC | Facility: CLINIC | Age: 73
End: 2023-08-28

## 2023-08-31 ENCOUNTER — TELEPHONE (OUTPATIENT)
Dept: FAMILY MEDICINE CLINIC | Facility: CLINIC | Age: 73
End: 2023-08-31

## 2023-09-21 ENCOUNTER — MED REC SCAN ONLY (OUTPATIENT)
Dept: FAMILY MEDICINE CLINIC | Facility: CLINIC | Age: 73
End: 2023-09-21

## 2023-10-02 ENCOUNTER — HOME HEALTH CHARGES (OUTPATIENT)
Dept: FAMILY MEDICINE CLINIC | Facility: CLINIC | Age: 73
End: 2023-10-02

## 2023-10-02 DIAGNOSIS — S82.141D DISPLACED BICONDYLAR FRACTURE OF RIGHT TIBIA, SUBSEQUENT ENCOUNTER FOR CLOSED FRACTURE WITH ROUTINE HEALING: Primary | ICD-10-CM

## 2023-10-06 ENCOUNTER — TELEPHONE (OUTPATIENT)
Dept: FAMILY MEDICINE CLINIC | Facility: CLINIC | Age: 73
End: 2023-10-06

## 2023-10-06 NOTE — TELEPHONE ENCOUNTER
[address] [facility]    Patient discharged from [facility] as of yesterday    Just fyi

## 2023-10-12 RX ORDER — UMECLIDINIUM 62.5 UG/1
1 AEROSOL, POWDER ORAL EVERY MORNING
Qty: 90 EACH | Refills: 0 | Status: SHIPPED | OUTPATIENT
Start: 2023-10-12

## 2023-10-12 NOTE — TELEPHONE ENCOUNTER
Asthma & COPD Medication Protocol Lkplna05/12/2023 01:30 AM    Asthma Action Score greater than or equal to 20    AAP/ACT given in last 12 months    Appointment in past 6 or next 3 months     Last OV 3/1/23  Last refilled 7/21/23 #180  0 refill

## 2023-10-13 ENCOUNTER — MED REC SCAN ONLY (OUTPATIENT)
Dept: FAMILY MEDICINE CLINIC | Facility: CLINIC | Age: 73
End: 2023-10-13

## 2023-10-23 ENCOUNTER — OFFICE VISIT (OUTPATIENT)
Dept: FAMILY MEDICINE CLINIC | Facility: CLINIC | Age: 73
End: 2023-10-23
Payer: MEDICARE

## 2023-10-23 VITALS
RESPIRATION RATE: 18 BRPM | OXYGEN SATURATION: 98 % | SYSTOLIC BLOOD PRESSURE: 134 MMHG | DIASTOLIC BLOOD PRESSURE: 82 MMHG | WEIGHT: 167.13 LBS | HEART RATE: 87 BPM | TEMPERATURE: 99 F | BODY MASS INDEX: 27 KG/M2

## 2023-10-23 DIAGNOSIS — Z00.00 ENCOUNTER FOR ANNUAL HEALTH EXAMINATION: Primary | ICD-10-CM

## 2023-10-23 DIAGNOSIS — K21.9 GASTROESOPHAGEAL REFLUX DISEASE WITHOUT ESOPHAGITIS: ICD-10-CM

## 2023-10-23 DIAGNOSIS — S82.141A CLOSED FRACTURE OF RIGHT TIBIAL PLATEAU, INITIAL ENCOUNTER: ICD-10-CM

## 2023-10-23 DIAGNOSIS — J45.41 MODERATE PERSISTENT ASTHMA WITH ACUTE EXACERBATION: ICD-10-CM

## 2023-10-23 DIAGNOSIS — I10 ESSENTIAL HYPERTENSION: ICD-10-CM

## 2023-10-23 DIAGNOSIS — H40.2290 CHRONIC NARROW ANGLE GLAUCOMA: ICD-10-CM

## 2023-10-23 DIAGNOSIS — E55.9 VITAMIN D DEFICIENCY: ICD-10-CM

## 2023-10-23 DIAGNOSIS — R73.9 HYPERGLYCEMIA: ICD-10-CM

## 2023-10-23 DIAGNOSIS — J41.0 SIMPLE CHRONIC BRONCHITIS (HCC): ICD-10-CM

## 2023-10-23 DIAGNOSIS — M80.00XG AGE-RELATED OSTEOPOROSIS WITH CURRENT PATHOLOGICAL FRACTURE WITH DELAYED HEALING: ICD-10-CM

## 2023-10-23 PROBLEM — S82.892A CLOSED LEFT ANKLE FRACTURE: Status: RESOLVED | Noted: 2021-12-09 | Resolved: 2023-10-23

## 2023-10-23 LAB
ALBUMIN SERPL-MCNC: 3.5 G/DL (ref 3.4–5)
ALBUMIN/GLOB SERPL: 1 {RATIO} (ref 1–2)
ALP LIVER SERPL-CCNC: 61 U/L
ALT SERPL-CCNC: 23 U/L
ANION GAP SERPL CALC-SCNC: 5 MMOL/L (ref 0–18)
AST SERPL-CCNC: 16 U/L (ref 15–37)
BASOPHILS # BLD AUTO: 0.07 X10(3) UL (ref 0–0.2)
BASOPHILS NFR BLD AUTO: 0.8 %
BILIRUB SERPL-MCNC: 0.4 MG/DL (ref 0.1–2)
BUN BLD-MCNC: 10 MG/DL (ref 7–18)
CALCIUM BLD-MCNC: 9.9 MG/DL (ref 8.5–10.1)
CHLORIDE SERPL-SCNC: 104 MMOL/L (ref 98–112)
CHOLEST SERPL-MCNC: 175 MG/DL (ref ?–200)
CO2 SERPL-SCNC: 31 MMOL/L (ref 21–32)
CREAT BLD-MCNC: 0.93 MG/DL
EGFRCR SERPLBLD CKD-EPI 2021: 65 ML/MIN/1.73M2 (ref 60–?)
EOSINOPHIL # BLD AUTO: 0.43 X10(3) UL (ref 0–0.7)
EOSINOPHIL NFR BLD AUTO: 5.2 %
ERYTHROCYTE [DISTWIDTH] IN BLOOD BY AUTOMATED COUNT: 12.5 %
FASTING PATIENT LIPID ANSWER: NO
FASTING STATUS PATIENT QL REPORTED: NO
GLOBULIN PLAS-MCNC: 3.4 G/DL (ref 2.8–4.4)
GLUCOSE BLD-MCNC: 88 MG/DL (ref 70–99)
HCT VFR BLD AUTO: 41.8 %
HDLC SERPL-MCNC: 75 MG/DL (ref 40–59)
HGB BLD-MCNC: 14 G/DL
IMM GRANULOCYTES # BLD AUTO: 0.01 X10(3) UL (ref 0–1)
IMM GRANULOCYTES NFR BLD: 0.1 %
LDLC SERPL CALC-MCNC: 88 MG/DL (ref ?–100)
LYMPHOCYTES # BLD AUTO: 0.94 X10(3) UL (ref 1–4)
LYMPHOCYTES NFR BLD AUTO: 11.4 %
MCH RBC QN AUTO: 30.7 PG (ref 26–34)
MCHC RBC AUTO-ENTMCNC: 33.5 G/DL (ref 31–37)
MCV RBC AUTO: 91.7 FL
MONOCYTES # BLD AUTO: 0.58 X10(3) UL (ref 0.1–1)
MONOCYTES NFR BLD AUTO: 7 %
NEUTROPHILS # BLD AUTO: 6.25 X10 (3) UL (ref 1.5–7.7)
NEUTROPHILS # BLD AUTO: 6.25 X10(3) UL (ref 1.5–7.7)
NEUTROPHILS NFR BLD AUTO: 75.5 %
NONHDLC SERPL-MCNC: 100 MG/DL (ref ?–130)
OSMOLALITY SERPL CALC.SUM OF ELEC: 288 MOSM/KG (ref 275–295)
PLATELET # BLD AUTO: 287 10(3)UL (ref 150–450)
POTASSIUM SERPL-SCNC: 3.4 MMOL/L (ref 3.5–5.1)
PROT SERPL-MCNC: 6.9 G/DL (ref 6.4–8.2)
RBC # BLD AUTO: 4.56 X10(6)UL
SODIUM SERPL-SCNC: 140 MMOL/L (ref 136–145)
T4 FREE SERPL-MCNC: 0.9 NG/DL (ref 0.8–1.7)
TRIGL SERPL-MCNC: 64 MG/DL (ref 30–149)
TSI SER-ACNC: 1.98 MIU/ML (ref 0.36–3.74)
VLDLC SERPL CALC-MCNC: 10 MG/DL (ref 0–30)
WBC # BLD AUTO: 8.3 X10(3) UL (ref 4–11)

## 2023-10-23 PROCEDURE — 82306 VITAMIN D 25 HYDROXY: CPT | Performed by: FAMILY MEDICINE

## 2023-10-23 PROCEDURE — 83036 HEMOGLOBIN GLYCOSYLATED A1C: CPT | Performed by: FAMILY MEDICINE

## 2023-10-23 PROCEDURE — 84439 ASSAY OF FREE THYROXINE: CPT | Performed by: FAMILY MEDICINE

## 2023-10-23 PROCEDURE — 80061 LIPID PANEL: CPT | Performed by: FAMILY MEDICINE

## 2023-10-23 PROCEDURE — 84443 ASSAY THYROID STIM HORMONE: CPT | Performed by: FAMILY MEDICINE

## 2023-10-23 PROCEDURE — 85025 COMPLETE CBC W/AUTO DIFF WBC: CPT | Performed by: FAMILY MEDICINE

## 2023-10-23 PROCEDURE — 80053 COMPREHEN METABOLIC PANEL: CPT | Performed by: FAMILY MEDICINE

## 2023-10-23 RX ORDER — ACETAMINOPHEN 160 MG/1
160 BAR, CHEWABLE ORAL EVERY 6 HOURS PRN
COMMUNITY

## 2023-10-23 RX ORDER — GABAPENTIN 300 MG/1
300 CAPSULE ORAL NIGHTLY
COMMUNITY

## 2023-10-24 LAB
EST. AVERAGE GLUCOSE BLD GHB EST-MCNC: 117 MG/DL (ref 68–126)
HBA1C MFR BLD: 5.7 % (ref ?–5.7)
VIT D+METAB SERPL-MCNC: 41.8 NG/ML (ref 30–100)

## 2023-11-08 ENCOUNTER — ORDER TRANSCRIPTION (OUTPATIENT)
Dept: PHYSICAL THERAPY | Facility: HOSPITAL | Age: 73
End: 2023-11-08

## 2023-11-08 ENCOUNTER — TELEPHONE (OUTPATIENT)
Dept: PHYSICAL THERAPY | Facility: HOSPITAL | Age: 73
End: 2023-11-08

## 2023-11-08 DIAGNOSIS — S82.141D DISPLACED BICONDYLAR FRACTURE OF RIGHT TIBIA, SUBSEQUENT ENCOUNTER FOR CLOSED FRACTURE WITH ROUTINE HEALING: Primary | ICD-10-CM

## 2023-11-24 RX ORDER — HYDROCHLOROTHIAZIDE 12.5 MG/1
12.5 TABLET ORAL DAILY
Qty: 90 TABLET | Refills: 0 | Status: SHIPPED | OUTPATIENT
Start: 2023-11-24

## 2023-11-24 NOTE — TELEPHONE ENCOUNTER
Hypertension Medications Protocol Eizucr0911/24/2023 01:30 AM   Protocol Details CMP or BMP in past 12 months    Last serum creatinine< 2.0    Appointment in past 6 or next 3 months       LOV 10/23/23    Labs 10/23/23 0.93 Creatinine    Last Refill 6/9/23 #90 Refill 1    No future appointments.

## 2023-12-26 RX ORDER — BUDESONIDE AND FORMOTEROL FUMARATE DIHYDRATE 160; 4.5 UG/1; UG/1
2 AEROSOL RESPIRATORY (INHALATION) 2 TIMES DAILY
Qty: 10.2 G | Refills: 0 | Status: SHIPPED | OUTPATIENT
Start: 2023-12-26

## 2023-12-26 NOTE — TELEPHONE ENCOUNTER
Asthma & COPD Medication Protocol Jvultz5212/24/2023 01:31 AM    Asthma Action Score greater than or equal to 20    AAP/ACT given in last 12 months    Appointment in past 6 or next 3 months       LOV 10/23/23    Last Refill 1/5/23 10.2 g Refill 11    No future appointments.

## 2024-01-01 NOTE — TELEPHONE ENCOUNTER
Last refilled 12/16/21 for #90 with 0 RF  LV with OP telemed 12/2/21  No future appt with pcp  Last CMP 10/26/21     Hypertension Medications Protocol Passed 03/18/2022 12:43 PM   Protocol Details  CMP or BMP in past 12 months    Last serum creatinine< 2.0    Appointment in past 6 or next 3 months No

## 2024-01-10 ENCOUNTER — OFFICE VISIT (OUTPATIENT)
Dept: FAMILY MEDICINE CLINIC | Facility: CLINIC | Age: 74
End: 2024-01-10
Payer: MEDICARE

## 2024-01-10 VITALS
DIASTOLIC BLOOD PRESSURE: 70 MMHG | OXYGEN SATURATION: 94 % | HEART RATE: 91 BPM | TEMPERATURE: 98 F | WEIGHT: 161.13 LBS | BODY MASS INDEX: 26 KG/M2 | RESPIRATION RATE: 18 BRPM | SYSTOLIC BLOOD PRESSURE: 126 MMHG

## 2024-01-10 DIAGNOSIS — H93.8X3 SENSATION OF FULLNESS IN BOTH EARS: ICD-10-CM

## 2024-01-10 DIAGNOSIS — J01.10 ACUTE NON-RECURRENT FRONTAL SINUSITIS: Primary | ICD-10-CM

## 2024-01-10 PROCEDURE — 99213 OFFICE O/P EST LOW 20 MIN: CPT | Performed by: NURSE PRACTITIONER

## 2024-01-10 RX ORDER — PREDNISONE 20 MG/1
20 TABLET ORAL DAILY
Qty: 5 TABLET | Refills: 0 | Status: SHIPPED | OUTPATIENT
Start: 2024-01-10 | End: 2024-01-15

## 2024-01-10 RX ORDER — TRAMADOL HYDROCHLORIDE 50 MG/1
50 TABLET ORAL EVERY 6 HOURS PRN
COMMUNITY
Start: 2023-08-23

## 2024-01-10 RX ORDER — AZITHROMYCIN 250 MG/1
TABLET, FILM COATED ORAL
Qty: 6 TABLET | Refills: 0 | Status: SHIPPED | OUTPATIENT
Start: 2024-01-10 | End: 2024-01-14

## 2024-01-10 NOTE — PROGRESS NOTES
CHIEF COMPLAINT:    Chief Complaint   Patient presents with    Cough    Sinus Problem     X 3 weeks        HISTORY OF PRESENT ILLNESS:    Xi presents today, January 10, 2024, for ear fullness and sinus pressure.    Began 3 weeks ago  Affecting face and sinuses, chest congestion, bilateral ear fullness  Has tried supportive care treatments including robitussin DM, inhalers, flonase, nebulizer treatments  Denies fevers or ear pain  Denies chest pain    ALLERGIES:  Allergies   Allergen Reactions    Doxycycline NAUSEA AND VOMITING    Penicillins DIARRHEA       CURRENT MEDICATIONS:  Current Outpatient Medications   Medication Sig Dispense Refill    traMADol 50 MG Oral Tab Take 1 tablet (50 mg total) by mouth every 6 (six) hours as needed for Pain.      Budesonide-Formoterol Fumarate (SYMBICORT) 160-4.5 MCG/ACT Inhalation Aerosol Inhale 2 puffs into the lungs 2 (two) times daily. 10.2 g 0    HYDROCHLOROTHIAZIDE 12.5 MG Oral Tab Take 1 tablet (12.5 mg total) by mouth daily. 90 tablet 0    gabapentin 300 MG Oral Cap Take 1 capsule (300 mg total) by mouth nightly. PRN      INCRUSE ELLIPTA 62.5 MCG/ACT Inhalation Aerosol Powder, Breath Activated Inhale 1 puff into the lungs every morning. 90 each 0    acyclovir 5 % External Ointment Apply 1 Application. topically every 3 (three) hours as needed. 15 g 0    ipratropium-albuterol 0.5-2.5 (3) MG/3ML Inhalation Solution INHALE ONE VIAL in nebulizer EVERY 4 HOURS AS NEEDED FOR COUGH/WHEEZING 270 mL 0    brimonidine 0.2 % Ophthalmic Solution brimonidine 0.2 % eye drops   PLACE 1 DROP IN BOTH EYES TWICE DAILY      albuterol 108 (90 Base) MCG/ACT Inhalation Aero Soln Inhale 2 puffs into the lungs every 6 (six) hours as needed for Wheezing. 6.7 g 5    latanoprost 0.005 % Ophthalmic Solution Place 2 drops into both eyes nightly.  1    Vitamin C 500 MG Oral Tab Take 1 tablet (500 mg total) by mouth every morning.      Calcium Citrate-Vitamin D (CITRACAL + D OR) Take 1 tablet by mouth  daily.      Cholecalciferol (VITAMIN D) 1000 UNITS Oral Cap Take 5,000 Units by mouth.        fexofenadine 180 MG Oral Tab Take 1 tablet (180 mg total) by mouth daily. (Patient not taking: Reported on 10/23/2023)      Naproxen Sodium 220 MG Oral Cap Take 220 mg by mouth 2 (two) times daily as needed. (Patient not taking: Reported on 10/23/2023)         MEDICAL HISTORY:  Past Medical History:   Diagnosis Date    COPD (chronic obstructive pulmonary disease) with chronic bronchitis      Past Surgical History:   Procedure Laterality Date          x3    TONSILLECTOMY       Family History   Problem Relation Age of Onset    Hypertension Father     Hypertension Mother     Other (Other) Mother         asthma/transient thyroid issue     Family Status   Relation Status    Fa  at age 76        suicide    Mo  at age 86        complications of perf diverticulum-sepsis    Dru Alive    Son Alive    Sis Alive    Bro (Not Specified)    Dru Alive     Social History     Socioeconomic History    Marital status:    Tobacco Use    Smoking status: Former     Packs/day: 1.00     Years: 20.00     Additional pack years: 0.00     Total pack years: 20.00     Types: Cigarettes     Quit date: 2013     Years since quitting: 10.0    Smokeless tobacco: Never   Vaping Use    Vaping Use: Never used   Substance and Sexual Activity    Alcohol use: No     Alcohol/week: 0.0 standard drinks of alcohol    Drug use: No       ROS:  GENERAL:  Denies recorded temperatures greater than 100.5F  RESPIRATORY:  Denies difficulty breathing  CARDIAC:  Denies chest pain with exertion    VITALS:   /70 (BP Location: Left arm, Patient Position: Sitting, Cuff Size: large)   Pulse 91   Temp 97.9 °F (36.6 °C) (Temporal)   Resp 18   Wt 161 lb 2 oz (73.1 kg)   SpO2 94%   BMI 26.01 kg/m²     Reviewed by Sosa Vasques MS, APRN, FNP-BC    PHYSICAL EXAM:    Constitutional:       Appears well.  Sitting upright on exam table.  Well  developed, well nourished, and in no acute distress  HEENT:      Facial features symmetric. Normocephalic and atraumatic     Sclera anicteric.  EOMs intact without nystagmus.  Pupils round and equal.  Ears:      Bilateral canals clear and without drainage or erythema.      TM's intact and dull, some tiny effusions to right ear.  Neutral in position.  Grossly normal hearing.    Neck:       Neck is supple. Trachea is midline.        No masses.       No lympadenopathy with palpation of submandibular, pre/posterior auricular, anterior/posterior cervical, occipital, and supraclavicular nodes.   Cardiovascular:      Heart sounds: Regular rate and rhythm without murmur  Pulmonary:      Chest expansion symmetric.  Breathing nonlabored. Lungs clear throughout  Musculoskeletal:         Movements smooth and controlled with appropriate coordination.       Gait intact, steady, nonantalgic.  Skin:     Warm and dry without discoloration.  Psychiatric:         Alert and oriented.  Calm and cooperative.  Speech is clear.     ASSESSMENT & PLAN:    1. Acute non-recurrent frontal sinusitis  - azithromycin 250 MG Oral Tab; Take 2 tablets (500 mg total) by mouth daily for 1 day, THEN 1 tablet (250 mg total) daily for 4 days.  Dispense: 6 tablet; Refill: 0  - predniSONE 20 MG Oral Tab; Take 1 tablet (20 mg total) by mouth daily for 5 days.  Dispense: 5 tablet; Refill: 0    2. Sensation of fullness in both ears  - predniSONE 20 MG Oral Tab; Take 1 tablet (20 mg total) by mouth daily for 5 days.  Dispense: 5 tablet; Refill: 0    Follow-up 5-7 days if needed

## 2024-01-26 ENCOUNTER — TELEPHONE (OUTPATIENT)
Dept: FAMILY MEDICINE CLINIC | Facility: CLINIC | Age: 74
End: 2024-01-26

## 2024-01-26 NOTE — TELEPHONE ENCOUNTER
PATIENT SCHEDULED FOR HER PRE OP WITH DR RDZ ON MONDAY 1-. PATIENT HAVING HARDWARE REMOVED FROM ANKLE.   DR EMERY CRANE ORTHO  [place] LOCATION [phone]  FAX: [phone]  PATIENT IS HAVING PROCEDURE DONE AT [facility] IN [place], [place]  SURGERY DATE TBT ONCE PRE OP SCHEDULED.  PATIENT WILL BRING ORDER CXR,EKG,CBC,BMP

## 2024-01-26 NOTE — TELEPHONE ENCOUNTER
Fyi  Future Appointments   Date Time Provider Department Center   1/29/2024  1:45 PM Lynsey Pettit, DO [facility] [place]

## 2024-01-29 ENCOUNTER — HOSPITAL ENCOUNTER (OUTPATIENT)
Dept: GENERAL RADIOLOGY | Age: 74
Discharge: HOME | End: 2024-01-29
Attending: FAMILY MEDICINE
Payer: MEDICARE

## 2024-01-29 ENCOUNTER — OFFICE VISIT (OUTPATIENT)
Dept: FAMILY MEDICINE CLINIC | Facility: CLINIC | Age: 74
End: 2024-01-29
Payer: MEDICARE

## 2024-01-29 VITALS — BODY MASS INDEX: 26 KG/M2 | TEMPERATURE: 99 F | OXYGEN SATURATION: 93 % | RESPIRATION RATE: 18 BRPM | WEIGHT: 163 LBS

## 2024-01-29 DIAGNOSIS — S82.141A CLOSED FRACTURE OF RIGHT TIBIAL PLATEAU, INITIAL ENCOUNTER: ICD-10-CM

## 2024-01-29 DIAGNOSIS — J41.0 SIMPLE CHRONIC BRONCHITIS (HCC): ICD-10-CM

## 2024-01-29 DIAGNOSIS — I10 ESSENTIAL HYPERTENSION: ICD-10-CM

## 2024-01-29 DIAGNOSIS — K21.9 GASTROESOPHAGEAL REFLUX DISEASE WITHOUT ESOPHAGITIS: ICD-10-CM

## 2024-01-29 DIAGNOSIS — Z01.818 PRE-OP EXAMINATION: Primary | ICD-10-CM

## 2024-01-29 DIAGNOSIS — Z01.818 PRE-OP EXAMINATION: ICD-10-CM

## 2024-01-29 DIAGNOSIS — E55.9 VITAMIN D DEFICIENCY: ICD-10-CM

## 2024-01-29 DIAGNOSIS — J45.40 MODERATE PERSISTENT ASTHMA WITHOUT COMPLICATION: ICD-10-CM

## 2024-01-29 DIAGNOSIS — S82.892D CLOSED FRACTURE OF LEFT ANKLE WITH ROUTINE HEALING, SUBSEQUENT ENCOUNTER: ICD-10-CM

## 2024-01-29 DIAGNOSIS — H40.2290 CHRONIC NARROW ANGLE GLAUCOMA: ICD-10-CM

## 2024-01-29 LAB
ANION GAP SERPL CALC-SCNC: 4 MMOL/L (ref 0–18)
BASOPHILS # BLD AUTO: 0.07 X10(3) UL (ref 0–0.2)
BASOPHILS NFR BLD AUTO: 0.9 %
BUN BLD-MCNC: 14 MG/DL (ref 9–23)
CALCIUM BLD-MCNC: 9.9 MG/DL (ref 8.5–10.1)
CHLORIDE SERPL-SCNC: 106 MMOL/L (ref 98–112)
CO2 SERPL-SCNC: 31 MMOL/L (ref 21–32)
CREAT BLD-MCNC: 1.28 MG/DL
EGFRCR SERPLBLD CKD-EPI 2021: 44 ML/MIN/1.73M2 (ref 60–?)
EOSINOPHIL # BLD AUTO: 0.43 X10(3) UL (ref 0–0.7)
EOSINOPHIL NFR BLD AUTO: 5.5 %
ERYTHROCYTE [DISTWIDTH] IN BLOOD BY AUTOMATED COUNT: 13.2 %
FASTING STATUS PATIENT QL REPORTED: NO
GLUCOSE BLD-MCNC: 92 MG/DL (ref 70–99)
HCT VFR BLD AUTO: 43.3 %
HGB BLD-MCNC: 14.2 G/DL
IMM GRANULOCYTES # BLD AUTO: 0.01 X10(3) UL (ref 0–1)
IMM GRANULOCYTES NFR BLD: 0.1 %
LYMPHOCYTES # BLD AUTO: 1.06 X10(3) UL (ref 1–4)
LYMPHOCYTES NFR BLD AUTO: 13.5 %
MCH RBC QN AUTO: 30.9 PG (ref 26–34)
MCHC RBC AUTO-ENTMCNC: 32.8 G/DL (ref 31–37)
MCV RBC AUTO: 94.3 FL
MONOCYTES # BLD AUTO: 0.47 X10(3) UL (ref 0.1–1)
MONOCYTES NFR BLD AUTO: 6 %
NEUTROPHILS # BLD AUTO: 5.81 X10 (3) UL (ref 1.5–7.7)
NEUTROPHILS # BLD AUTO: 5.81 X10(3) UL (ref 1.5–7.7)
NEUTROPHILS NFR BLD AUTO: 74 %
OSMOLALITY SERPL CALC.SUM OF ELEC: 292 MOSM/KG (ref 275–295)
PLATELET # BLD AUTO: 300 10(3)UL (ref 150–450)
POTASSIUM SERPL-SCNC: 3.9 MMOL/L (ref 3.5–5.1)
RBC # BLD AUTO: 4.59 X10(6)UL
SODIUM SERPL-SCNC: 141 MMOL/L (ref 136–145)
WBC # BLD AUTO: 7.9 X10(3) UL (ref 4–11)

## 2024-01-29 PROCEDURE — 99213 OFFICE O/P EST LOW 20 MIN: CPT | Performed by: FAMILY MEDICINE

## 2024-01-29 PROCEDURE — 71046 X-RAY EXAM CHEST 2 VIEWS: CPT | Performed by: FAMILY MEDICINE

## 2024-01-29 PROCEDURE — 93000 ELECTROCARDIOGRAM COMPLETE: CPT | Performed by: FAMILY MEDICINE

## 2024-01-29 PROCEDURE — 80048 BASIC METABOLIC PNL TOTAL CA: CPT | Performed by: FAMILY MEDICINE

## 2024-01-29 PROCEDURE — 85025 COMPLETE CBC W/AUTO DIFF WBC: CPT | Performed by: FAMILY MEDICINE

## 2024-01-29 RX ORDER — BUDESONIDE AND FORMOTEROL FUMARATE DIHYDRATE 160; 4.5 UG/1; UG/1
2 AEROSOL RESPIRATORY (INHALATION) 2 TIMES DAILY
COMMUNITY
End: 2024-01-29 | Stop reason: ALTCHOICE

## 2024-01-29 RX ORDER — ALBUTEROL SULFATE 90 UG/1
2 AEROSOL, METERED RESPIRATORY (INHALATION) EVERY 6 HOURS PRN
Qty: 18 G | Refills: 0 | Status: SHIPPED | OUTPATIENT
Start: 2024-01-29

## 2024-01-29 RX ORDER — UMECLIDINIUM 62.5 UG/1
1 AEROSOL, POWDER ORAL EVERY MORNING
COMMUNITY

## 2024-01-29 NOTE — TELEPHONE ENCOUNTER
Asthma & COPD Medication Protocol Hhpdgd3901/28/2024 03:36 PM    Asthma Action Score greater than or equal to 20    AAP/ACT given in last 12 months    Appointment in past 6 or next 3 months     Last OV 10/23/23 wellness,  1/10/24 (azra)  Last refilled 8- ventolin 6.7 g  5 refill

## 2024-01-29 NOTE — H&P
Xi Galvin is a 73 year old female who presents for a pre-operative physical exam. Patient is to have hardware removal from left ankle, to be done by Dr. Sales at [facility] in [place] on 2/2/24.      HPI:   Pt complains of pain in right ankle. H/o fracture of right ankle with hardware placement. Has continued pain. Ortho evaluated and found that hardware was not in the proper place and needs to be removed.     Otherwise feeling well. No new concerns.     Asthma/chronic bronchitis is well controlled with current inhalers.     No h/o cardio disease. No cardiac symptoms.     No h/o complications with anesthesia or family history.   No h/o blood transfusions.   No recent fevers or illness.   No URI symptoms.   No GI symptoms.   No  symptoms. No urinary symptoms.      Current Outpatient Medications   Medication Sig Dispense Refill    albuterol (VENTOLIN HFA) 108 (90 Base) MCG/ACT Inhalation Aero Soln Inhale 2 puffs into the lungs every 6 (six) hours as needed for Wheezing. 18 g 0    Budesonide-Formoterol Fumarate (SYMBICORT) 160-4.5 MCG/ACT Inhalation Aerosol Inhale 2 puffs into the lungs 2 (two) times daily. 10.2 g 0    HYDROCHLOROTHIAZIDE 12.5 MG Oral Tab Take 1 tablet (12.5 mg total) by mouth daily. 90 tablet 0    gabapentin 300 MG Oral Cap Take 1 capsule (300 mg total) by mouth nightly. PRN      INCRUSE ELLIPTA 62.5 MCG/ACT Inhalation Aerosol Powder, Breath Activated Inhale 1 puff into the lungs every morning. 90 each 0    acyclovir 5 % External Ointment Apply 1 Application. topically every 3 (three) hours as needed. 15 g 0    ipratropium-albuterol 0.5-2.5 (3) MG/3ML Inhalation Solution INHALE ONE VIAL in nebulizer EVERY 4 HOURS AS NEEDED FOR COUGH/WHEEZING 270 mL 0    brimonidine 0.2 % Ophthalmic Solution brimonidine 0.2 % eye drops   PLACE 1 DROP IN BOTH EYES TWICE DAILY      latanoprost 0.005 % Ophthalmic Solution Place 2 drops into both eyes nightly.  1    Vitamin C 500 MG Oral Tab  Take 1 tablet (500 mg total) by mouth every morning.      Calcium Citrate-Vitamin D (CITRACAL + D OR) Take 1 tablet by mouth daily.      Cholecalciferol (VITAMIN D) 1000 UNITS Oral Cap Take 5,000 Units by mouth.        umeclidinium bromide (INCRUSE ELLIPTA) 62.5 MCG/ACT Inhalation Aerosol Powder, Breath Activated Inhale 1 puff into the lungs every morning. (Patient not taking: Reported on 2024)        Allergies:   Allergies   Allergen Reactions    Doxycycline NAUSEA AND VOMITING    Penicillins DIARRHEA      Past Medical History:   Diagnosis Date    COPD (chronic obstructive pulmonary disease) with chronic bronchitis       Past Surgical History:   Procedure Laterality Date          x3    TONSILLECTOMY        Family History   Problem Relation Age of Onset    Hypertension Father     Hypertension Mother     Other (Other) Mother         asthma/transient thyroid issue      Social History:   Social History     Socioeconomic History    Marital status:    Tobacco Use    Smoking status: Former     Packs/day: 1.00     Years: 20.00     Additional pack years: 0.00     Total pack years: 20.00     Types: Cigarettes     Quit date: 2013     Years since quitting: 10.0    Smokeless tobacco: Never   Vaping Use    Vaping Use: Never used   Substance and Sexual Activity    Alcohol use: No     Alcohol/week: 0.0 standard drinks of alcohol    Drug use: No      Occ: retired. : yes. Children: grown.   Exercise: minimal.  Diet: watches minimally     REVIEW OF SYSTEMS:   GENERAL: feels well otherwise  SKIN: denies any unusual skin lesions  EYES:denies blurred vision or double vision  HEENT: denies nasal congestion, sinus pain or ST  LUNGS: denies shortness of breath with exertion  CARDIOVASCULAR: denies chest pain on exertion  GI: denies abdominal pain,denies heartburn  : denies dysuria, vaginal discharge or itching,  MUSCULOSKELETAL: denies back pain, + joint pain as above   NEURO: denies headaches  PSYCHE: denies  depression or anxiety  HEMATOLOGIC: denies hx of anemia  ENDOCRINE: denies thyroid history  ALL/ASTHMA: denies hx of allergy or asthma    EXAM:   Temp 98.8 °F (37.1 °C) (Temporal)   Resp 18   Wt 163 lb (73.9 kg)   SpO2 93%   BMI 26.31 kg/m²   GENERAL: well developed, well nourished,in no apparent distress  SKIN: no rashes,no suspicious lesions  HEENT: atraumatic, normocephalic,ears and throat are clear  EYES:PERRLA, EOMI, conjunctiva are clear  NECK: supple,no adenopathy,   CHEST: no chest tenderness  LUNGS: clear to auscultation  CARDIO: RRR without murmur  GI: good BS's,no masses, HSM or tenderness  : deferred  MUSCULOSKELETAL: back is not tender,FROM of the back, + swelling in left ankle.   EXTREMITIES: no cyanosis, clubbing or edema  NEURO: Oriented times three,cranial nerves are intact,motor and sensory are grossly intact    ASSESSMENT AND PLAN:   Xi Galvin is a 73 year old female who presents for a pre-operative physical exam. Patient is to have hardware removal from left ankle, to be done by Dr. Sales at [facility] in [place] on 2/2/24. Pt has the following conditions:     1. Pre-op examination  Cleared for surgery at this time. Testing is unremarkable. CXR normal. EKG is the same as it's been since 2017.   - EKG shows:   Normal sinus rhythm   Possible Left atrial enlargement, Left anterior fascicular block, Abnormal ECG   No change from previous on file 12/14/17   - XR CHEST PA + LAT CHEST (CPT=71046); Future  - EKG with interpretation and Report -IN OFFICE [40858]  - CBC With Differential With Platelet; Future  - Basic Metabolic Panel (8); Future  - VENIPUNCTURE  - CBC With Differential With Platelet  - Basic Metabolic Panel (8)    2. Closed fracture of left ankle with routine healing, subsequent encounter  Now needs hardware removed. Scheduled for procedure.     3. Essential hypertension  Controlled with current meds.     4. Gastroesophageal reflux disease without  esophagitis  Doing well.     5. Simple chronic bronchitis (HCC)  Doing well with current inhalers.     6. Moderate persistent asthma without complication  Doing well with current inhalers.     7. Closed fracture of right tibial plateau, initial encounter  Healing well, following with ortho.     8. Chronic narrow angle glaucoma  Eye drops per ophthalmology.     9. Vitamin D deficiency  Last checked in the fall and level was good. Continue current meds.        Pt has no significant history of cardiac or pulmonary conditions. Pt is a good surgical candidate. This consult was sent back the referring physician, Dr. Sales.

## 2024-01-30 ENCOUNTER — TELEPHONE (OUTPATIENT)
Dept: FAMILY MEDICINE CLINIC | Facility: CLINIC | Age: 74
End: 2024-01-30

## 2024-01-30 PROBLEM — S82.892A CLOSED FRACTURE OF LEFT ANKLE: Status: ACTIVE | Noted: 2021-12-09

## 2024-01-30 LAB
ATRIAL RATE: 74 BPM
P AXIS: 77 DEGREES
P-R INTERVAL: 140 MS
Q-T INTERVAL: 402 MS
QRS DURATION: 94 MS
QTC CALCULATION (BEZET): 446 MS
R AXIS: -55 DEGREES
T AXIS: 65 DEGREES
VENTRICULAR RATE: 74 BPM

## 2024-01-31 NOTE — TELEPHONE ENCOUNTER
Please fax my pre op note along with results from Monday, CXR report and EKG.   Results are in my bottom bin on my counter.     Surgery is this Friday.

## 2024-03-16 RX ORDER — BUDESONIDE AND FORMOTEROL FUMARATE DIHYDRATE 160; 4.5 UG/1; UG/1
2 AEROSOL RESPIRATORY (INHALATION) 2 TIMES DAILY
Qty: 10.2 G | Refills: 0 | Status: SHIPPED | OUTPATIENT
Start: 2024-03-16

## 2024-03-16 NOTE — TELEPHONE ENCOUNTER
Asthma & COPD Medication Protocol Kqgkbb2103/16/2024 10:16 AM   Protocol Details Asthma Action Score greater than or equal to 20    AAP/ACT given in last 12 months    Appointment in past 6 or next 3 months     Routing to provider per protocol.   Budesonide-Formoterol Fumarate (SYMBICORT) 160-4.5 MCG/ACT Inhalation Aerosol   Last refilled on 12/26/23 for #10.2g  with 0 rf.   Last labs 1/29/24.   Last seen on 1/29/24.     No future appointments.       Thank you.

## 2024-03-19 RX ORDER — HYDROCHLOROTHIAZIDE 12.5 MG/1
12.5 TABLET ORAL DAILY
Qty: 90 TABLET | Refills: 0 | Status: SHIPPED | OUTPATIENT
Start: 2024-03-19

## 2024-03-19 NOTE — TELEPHONE ENCOUNTER
Pt failed refill protocol for the following reasons:   Name from pharmacy: Hydrochlorothiazide 12.5 Mg Tab Acta         Will file in chart as: HYDROCHLOROTHIAZIDE 12.5 MG Oral Tab    Sig: Take 1 tablet by mouth daily.    Disp: 90 tablet    Refills: 0    Start: 3/19/2024    Class: Normal    Non-formulary    Last ordered: 3 months ago (11/24/2023) by Lynsey Pettit DO    Last refill: 11/24/2023    Rx #: [id number]    Hypertension Medications Protocol Vqpgpf6803/19/2024 10:03 AM   Protocol Details EGFRCR or GFRNAA > 50    CMP or BMP in past 12 months    Last BP reading less than 140/90    In person appointment or virtual visit in the past 12 mos or appointment in next 3 mos      To be filled at: [id number] [address] [phone], [phone]         Last refill: 11/24/23  Last appt: 1/29/24  Next appt: No future appointments.      Forward to Dr. Pettit, please advise on refills. Thank you.

## 2024-04-16 RX ORDER — UMECLIDINIUM 62.5 UG/1
1 AEROSOL, POWDER ORAL EVERY MORNING
Qty: 90 EACH | Refills: 0 | Status: SHIPPED | OUTPATIENT
Start: 2024-04-16

## 2024-04-16 RX ORDER — BUDESONIDE AND FORMOTEROL FUMARATE DIHYDRATE 160; 4.5 UG/1; UG/1
2 AEROSOL RESPIRATORY (INHALATION) 2 TIMES DAILY
Qty: 10.2 G | Refills: 0 | Status: SHIPPED | OUTPATIENT
Start: 2024-04-16

## 2024-04-16 NOTE — TELEPHONE ENCOUNTER
Asthma & COPD Medication Protocol Jrnfpg8204/16/2024 09:26 AM   Protocol Details Asthma Action Score greater than or equal to 20    AAP/ACT given in last 12 months    Appointment in past 6 or next 3 months     Asthma & COPD Medication Protocol Zossrm6104/16/2024 09:26 AM   Protocol Details Asthma Action Score greater than or equal to 20    AAP/ACT given in last 12 months    Appointment in past 6 or next 3 months       LOV 2/15/24     Last Refill   Budesonide-Formoterol Fumarate 160-4.5 MCG/ACT Inhalation Aerosol 10.2 g 0 3/16/2024       Medication Quantity Refills Start End   INCRUSE ELLIPTA 62.5 MCG/ACT Inhalation Aerosol Powder, Breath Activated 90 each 0 10/12/2023      No future appointments.

## 2024-05-01 NOTE — TELEPHONE ENCOUNTER
Asthma & COPD Medication Protocol Rtxryy8405/01/2024 08:22 AM   Protocol Details Asthma Action Score greater than or equal to 20    AAP/ACT given in last 12 months    Appointment in past 6 or next 3 months   Routing to provider per protocol.   albuterol (VENTOLIN HFA) 108 (90 Base) MCG/ACT Inhalation Aero Soln   Last refilled on 1/29/24 for #18g  with 0 rf.   Last labs 1/29/24.   Last seen on 1/29/24.     No future appointments.       Thank you.

## 2024-05-02 RX ORDER — ALBUTEROL SULFATE 90 UG/1
2 AEROSOL, METERED RESPIRATORY (INHALATION) EVERY 6 HOURS PRN
Qty: 18 G | Refills: 0 | Status: SHIPPED | OUTPATIENT
Start: 2024-05-02

## 2024-05-18 RX ORDER — BUDESONIDE AND FORMOTEROL FUMARATE DIHYDRATE 160; 4.5 UG/1; UG/1
2 AEROSOL RESPIRATORY (INHALATION) 2 TIMES DAILY
Qty: 10.2 G | Refills: 0 | Status: SHIPPED | OUTPATIENT
Start: 2024-05-18

## 2024-05-18 NOTE — TELEPHONE ENCOUNTER
Asthma & COPD Medication Protocol Vgitph2805/18/2024 09:41 AM   Protocol Details Asthma Action Score greater than or equal to 20    AAP/ACT given in last 12 months    Appointment in past 6 or next 3 months     Routing to provider per protocol.   Budesonide-Formoterol Fumarate 160-4.5 MCG/ACT Inhalation Aerosol   Last refilled on 4/16/24 for #10.2g  with 0 rf.   Last labs 1/29/24.   Last seen on 1/29/24.     No future appointments.       Thank you.

## 2024-06-17 ENCOUNTER — TELEPHONE (OUTPATIENT)
Dept: FAMILY MEDICINE CLINIC | Facility: CLINIC | Age: 74
End: 2024-06-17

## 2024-06-17 DIAGNOSIS — Z12.31 ENCOUNTER FOR SCREENING MAMMOGRAM FOR MALIGNANT NEOPLASM OF BREAST: Primary | ICD-10-CM

## 2024-06-17 DIAGNOSIS — J41.0 SIMPLE CHRONIC BRONCHITIS (HCC): ICD-10-CM

## 2024-06-17 RX ORDER — BUDESONIDE AND FORMOTEROL FUMARATE DIHYDRATE 160; 4.5 UG/1; UG/1
2 AEROSOL RESPIRATORY (INHALATION) 2 TIMES DAILY
Qty: 10.2 G | Refills: 5 | Status: SHIPPED | OUTPATIENT
Start: 2024-06-17

## 2024-06-17 RX ORDER — HYDROCHLOROTHIAZIDE 12.5 MG/1
12.5 TABLET ORAL DAILY
Qty: 90 TABLET | Refills: 0 | Status: SHIPPED | OUTPATIENT
Start: 2024-06-17

## 2024-06-17 RX ORDER — BUDESONIDE AND FORMOTEROL FUMARATE DIHYDRATE 160; 4.5 UG/1; UG/1
2 AEROSOL RESPIRATORY (INHALATION) 2 TIMES DAILY
Qty: 10.2 G | Refills: 0 | OUTPATIENT
Start: 2024-06-17

## 2024-06-17 NOTE — TELEPHONE ENCOUNTER
Hypertension Medications Protocol Twvnao6206/17/2024 09:38 AM   Protocol Details EGFRCR or GFRNAA > 50    CMP or BMP in past 12 months    Last BP reading less than 140/90    In person appointment or virtual visit in the past 12 mos or appointment in next 3 mos      Routing to provider per protocol.   hydroCHLOROthiazide 12.5 MG Oral Tab   Last refilled on 3/19/24 for #90  with 0 rf.   Last labs 1/29/24.   Last seen on 1/29/24.     No future appointments.       Thank you.       Asthma & COPD Medication Protocol Fqoayd6606/17/2024 09:38 AM   Protocol Details Asthma Action Score greater than or equal to 20    AAP/ACT given in last 12 months    Appointment in past 6 or next 3 months      Routing to provider per protocol.   BUDESONIDE-FORMOTEROL FUMARATE 160-4.5 MCG/ACT Inhalation Aerosol   Last refilled on 5/18/24 for #10.2g  with 0 rf.

## 2024-06-17 NOTE — TELEPHONE ENCOUNTER
Patient is DNR/DNI status as per patient's POLST, which has been reviewed.   Patient notified via detailed voicemail left at cell number (ok per  HIPAA consent)

## 2024-07-11 RX ORDER — UMECLIDINIUM 62.5 UG/1
1 AEROSOL, POWDER ORAL EVERY MORNING
Qty: 90 EACH | Refills: 0 | Status: SHIPPED | OUTPATIENT
Start: 2024-07-11

## 2024-07-11 NOTE — TELEPHONE ENCOUNTER
Asthma & COPD Medication Protocol Jpizky0607/11/2024 10:40 AM   Protocol Details Asthma Action Score greater than or equal to 20    AAP/ACT given in last 12 months    Appointment in past 6 or next 3 months   Routing to provider per protocol.   INCRUSE ELLIPTA 62.5 MCG/ACT Inhalation Aerosol Powder, Breath Activated   Last refilled on 4/16/24 for #90  with 0 rf.   Last labs 1/29/24.   Last seen on 1/29/24.       Future Appointments   Date Time Provider Department Center   10/3/2024 10:00 AM Lynsey Pettit DO [facility] [place]   10/3/2024 11:20 AM EUFEMIA VASQUEZ [facility] [place]          Thank you.

## 2024-07-31 NOTE — TELEPHONE ENCOUNTER
Asthma & COPD Medication Protocol Itzjtc8007/31/2024 10:49 AM   Protocol Details Asthma Action Score greater than or equal to 20    AAP/ACT given in last 12 months    Appointment in past 6 or next 3 months   Routing to provider per protocol.   albuterol (VENTOLIN HFA) 108 (90 Base) MCG/ACT Inhalation Aero Soln   Last refilled on 5/2/24 for #18g  with 0 rf.   Last labs 1/29/24.   Last seen on 1/29/24.       Future Appointments   Date Time Provider Department Center   10/3/2024 10:00 AM Lynsey Pettit DO [facility]   10/3/2024 11:20 AM EUFEMIA [facility]          Thank you.     Routing to provider per protocol.   acyclovir 5 % External Ointment   Last refilled on 2/3/23 for #15g  with 0 rf.

## 2024-08-01 RX ORDER — ALBUTEROL SULFATE 90 UG/1
2 AEROSOL, METERED RESPIRATORY (INHALATION) EVERY 6 HOURS PRN
Qty: 18 G | Refills: 0 | Status: SHIPPED | OUTPATIENT
Start: 2024-08-01

## 2024-08-01 RX ORDER — ACYCLOVIR 50 MG/G
1 OINTMENT TOPICAL
Qty: 15 G | Refills: 0 | Status: SHIPPED | OUTPATIENT
Start: 2024-08-01

## 2024-08-07 ENCOUNTER — TELEPHONE (OUTPATIENT)
Dept: FAMILY MEDICINE CLINIC | Facility: CLINIC | Age: 74
End: 2024-08-07

## 2024-08-07 NOTE — TELEPHONE ENCOUNTER
Received paperwork from cover my meds to start a prior authorization Acyclovir 5% Ointment    Answered questions submitted prior authorization

## 2024-08-08 NOTE — TELEPHONE ENCOUNTER
Received approval from [facility] on Acyclovir  5% ointment 05/10/2024-08/08/2025   Case Number [id number]      Pharmacy informed

## 2024-09-12 RX ORDER — HYDROCHLOROTHIAZIDE 12.5 MG/1
12.5 TABLET ORAL DAILY
Qty: 90 TABLET | Refills: 0 | Status: SHIPPED | OUTPATIENT
Start: 2024-09-12

## 2024-09-12 NOTE — TELEPHONE ENCOUNTER
Hypertension Medications Protocol Lripyu8909/12/2024 11:34 AM   Protocol Details EGFRCR or GFRNAA > 50    CMP or BMP in past 12 months    Last BP reading less than 140/90    In person appointment or virtual visit in the past 12 mos or appointment in next 3 mos            LOV 1/29/24     Last Refill   Medication Quantity Refills Start End   HYDROCHLOROTHIAZIDE 12.5 MG Oral Tab 90 tablet 0 6/17/2024         Labs 1/29/24     Last BP     Future Appointments   Date Time Provider Department Center   10/3/2024 10:00 AM Lynsey Pettit DO [facility]   10/3/2024 11:20 AM EUFEMIA VASQUEZ [facility]

## 2024-10-03 ENCOUNTER — HOSPITAL ENCOUNTER (OUTPATIENT)
Dept: MAMMOGRAPHY | Age: 74
Discharge: HOME | End: 2024-10-03
Attending: FAMILY MEDICINE
Payer: MEDICARE

## 2024-10-03 ENCOUNTER — OFFICE VISIT (OUTPATIENT)
Dept: FAMILY MEDICINE CLINIC | Facility: CLINIC | Age: 74
End: 2024-10-03
Payer: MEDICARE

## 2024-10-03 VITALS
WEIGHT: 168 LBS | SYSTOLIC BLOOD PRESSURE: 138 MMHG | TEMPERATURE: 98 F | OXYGEN SATURATION: 95 % | RESPIRATION RATE: 20 BRPM | BODY MASS INDEX: 27 KG/M2 | HEART RATE: 91 BPM | DIASTOLIC BLOOD PRESSURE: 92 MMHG

## 2024-10-03 DIAGNOSIS — S82.892D CLOSED FRACTURE OF LEFT ANKLE WITH ROUTINE HEALING, SUBSEQUENT ENCOUNTER: ICD-10-CM

## 2024-10-03 DIAGNOSIS — M80.00XG AGE-RELATED OSTEOPOROSIS WITH CURRENT PATHOLOGICAL FRACTURE WITH DELAYED HEALING: ICD-10-CM

## 2024-10-03 DIAGNOSIS — R52 PAIN: ICD-10-CM

## 2024-10-03 DIAGNOSIS — J01.00 ACUTE NON-RECURRENT MAXILLARY SINUSITIS: ICD-10-CM

## 2024-10-03 DIAGNOSIS — H40.2290 CHRONIC NARROW ANGLE GLAUCOMA: ICD-10-CM

## 2024-10-03 DIAGNOSIS — R73.9 HYPERGLYCEMIA: ICD-10-CM

## 2024-10-03 DIAGNOSIS — J41.0 SIMPLE CHRONIC BRONCHITIS (HCC): ICD-10-CM

## 2024-10-03 DIAGNOSIS — I10 ESSENTIAL HYPERTENSION: ICD-10-CM

## 2024-10-03 DIAGNOSIS — J45.41 MODERATE PERSISTENT ASTHMA WITH ACUTE EXACERBATION (HCC): ICD-10-CM

## 2024-10-03 DIAGNOSIS — S82.141A CLOSED FRACTURE OF RIGHT TIBIAL PLATEAU, INITIAL ENCOUNTER: ICD-10-CM

## 2024-10-03 DIAGNOSIS — K21.9 GASTROESOPHAGEAL REFLUX DISEASE WITHOUT ESOPHAGITIS: ICD-10-CM

## 2024-10-03 DIAGNOSIS — E55.9 VITAMIN D DEFICIENCY: ICD-10-CM

## 2024-10-03 DIAGNOSIS — J44.9 CHRONIC OBSTRUCTIVE PULMONARY DISEASE, UNSPECIFIED COPD TYPE (HCC): ICD-10-CM

## 2024-10-03 DIAGNOSIS — Z12.11 SCREENING FOR MALIGNANT NEOPLASM OF COLON: ICD-10-CM

## 2024-10-03 DIAGNOSIS — Z12.31 ENCOUNTER FOR SCREENING MAMMOGRAM FOR MALIGNANT NEOPLASM OF BREAST: ICD-10-CM

## 2024-10-03 DIAGNOSIS — M85.80 OSTEOPENIA, UNSPECIFIED LOCATION: ICD-10-CM

## 2024-10-03 DIAGNOSIS — Z00.00 ENCOUNTER FOR ANNUAL HEALTH EXAMINATION: Primary | ICD-10-CM

## 2024-10-03 LAB
ALBUMIN SERPL-MCNC: 4.6 G/DL (ref 3.2–4.8)
ALBUMIN/GLOB SERPL: 1.9 {RATIO} (ref 1–2)
ALP LIVER SERPL-CCNC: 68 U/L
ALT SERPL-CCNC: 17 U/L
ANION GAP SERPL CALC-SCNC: 10 MMOL/L (ref 0–18)
AST SERPL-CCNC: 22 U/L (ref ?–34)
BASOPHILS # BLD AUTO: 0.06 X10(3) UL (ref 0–0.2)
BASOPHILS NFR BLD AUTO: 0.7 %
BILIRUB SERPL-MCNC: 0.4 MG/DL (ref 0.2–1.1)
BUN BLD-MCNC: 19 MG/DL (ref 9–23)
CALCIUM BLD-MCNC: 10.2 MG/DL (ref 8.7–10.4)
CHLORIDE SERPL-SCNC: 104 MMOL/L (ref 98–112)
CHOLEST SERPL-MCNC: 193 MG/DL (ref ?–200)
CO2 SERPL-SCNC: 27 MMOL/L (ref 21–32)
CREAT BLD-MCNC: 0.91 MG/DL
EGFRCR SERPLBLD CKD-EPI 2021: 66 ML/MIN/1.73M2 (ref 60–?)
EOSINOPHIL # BLD AUTO: 0.5 X10(3) UL (ref 0–0.7)
EOSINOPHIL NFR BLD AUTO: 6 %
ERYTHROCYTE [DISTWIDTH] IN BLOOD BY AUTOMATED COUNT: 12.7 %
EST. AVERAGE GLUCOSE BLD GHB EST-MCNC: 117 MG/DL (ref 68–126)
FASTING PATIENT LIPID ANSWER: YES
FASTING STATUS PATIENT QL REPORTED: YES
GLOBULIN PLAS-MCNC: 2.4 G/DL (ref 2–3.5)
GLUCOSE BLD-MCNC: 86 MG/DL (ref 70–99)
HBA1C MFR BLD: 5.7 % (ref ?–5.7)
HCT VFR BLD AUTO: 44.5 %
HDLC SERPL-MCNC: 68 MG/DL (ref 40–59)
HGB BLD-MCNC: 15.1 G/DL
IMM GRANULOCYTES # BLD AUTO: 0.03 X10(3) UL (ref 0–1)
IMM GRANULOCYTES NFR BLD: 0.4 %
LDLC SERPL CALC-MCNC: 118 MG/DL (ref ?–100)
LYMPHOCYTES # BLD AUTO: 1.07 X10(3) UL (ref 1–4)
LYMPHOCYTES NFR BLD AUTO: 12.7 %
MCH RBC QN AUTO: 31.7 PG (ref 26–34)
MCHC RBC AUTO-ENTMCNC: 33.9 G/DL (ref 31–37)
MCV RBC AUTO: 93.5 FL
MONOCYTES # BLD AUTO: 0.61 X10(3) UL (ref 0.1–1)
MONOCYTES NFR BLD AUTO: 7.3 %
NEUTROPHILS # BLD AUTO: 6.13 X10 (3) UL (ref 1.5–7.7)
NEUTROPHILS # BLD AUTO: 6.13 X10(3) UL (ref 1.5–7.7)
NEUTROPHILS NFR BLD AUTO: 72.9 %
NONHDLC SERPL-MCNC: 125 MG/DL (ref ?–130)
OSMOLALITY SERPL CALC.SUM OF ELEC: 294 MOSM/KG (ref 275–295)
PLATELET # BLD AUTO: 291 10(3)UL (ref 150–450)
POTASSIUM SERPL-SCNC: 3.5 MMOL/L (ref 3.5–5.1)
PROT SERPL-MCNC: 7 G/DL (ref 5.7–8.2)
RBC # BLD AUTO: 4.76 X10(6)UL
SODIUM SERPL-SCNC: 141 MMOL/L (ref 136–145)
TRIGL SERPL-MCNC: 34 MG/DL (ref 30–149)
VLDLC SERPL CALC-MCNC: 6 MG/DL (ref 0–30)
WBC # BLD AUTO: 8.4 X10(3) UL (ref 4–11)

## 2024-10-03 PROCEDURE — 85025 COMPLETE CBC W/AUTO DIFF WBC: CPT | Performed by: FAMILY MEDICINE

## 2024-10-03 PROCEDURE — 80053 COMPREHEN METABOLIC PANEL: CPT | Performed by: FAMILY MEDICINE

## 2024-10-03 PROCEDURE — 77063 BREAST TOMOSYNTHESIS BI: CPT | Performed by: FAMILY MEDICINE

## 2024-10-03 PROCEDURE — 77067 SCR MAMMO BI INCL CAD: CPT | Performed by: FAMILY MEDICINE

## 2024-10-03 PROCEDURE — 80061 LIPID PANEL: CPT | Performed by: FAMILY MEDICINE

## 2024-10-03 PROCEDURE — 83036 HEMOGLOBIN GLYCOSYLATED A1C: CPT | Performed by: FAMILY MEDICINE

## 2024-10-03 RX ORDER — BUDESONIDE AND FORMOTEROL FUMARATE DIHYDRATE 160; 4.5 UG/1; UG/1
2 AEROSOL RESPIRATORY (INHALATION) 2 TIMES DAILY
Qty: 30.6 G | Refills: 3 | Status: SHIPPED | OUTPATIENT
Start: 2024-10-03

## 2024-10-03 RX ORDER — IPRATROPIUM BROMIDE AND ALBUTEROL SULFATE 2.5; .5 MG/3ML; MG/3ML
SOLUTION RESPIRATORY (INHALATION)
Qty: 270 ML | Refills: 0 | Status: SHIPPED | OUTPATIENT
Start: 2024-10-03

## 2024-10-03 RX ORDER — AZITHROMYCIN 250 MG/1
TABLET, FILM COATED ORAL
Qty: 6 TABLET | Refills: 0 | Status: SHIPPED | OUTPATIENT
Start: 2024-10-03 | End: 2024-10-07

## 2024-10-03 NOTE — PROGRESS NOTES
Subjective:   Xi Galvin is a 74 year old female who presents for a Medicare Subsequent Annual Wellness visit (Pt already had Initial Annual Wellness) and scheduled follow up of multiple significant but stable problems.     Knee and ankle fractures are healed from last year. Just recently started going up stairs normally. Getting better slowly.     Chronic bronchitis: doing well with symbicort and incruse daily. Uses nebs as needed, but that is infrequently. Needs refills.     Recently had a respiratory illness, started over a week ago.   Has some throat irritation, voice raspy. Doesn't feel like it's in her chest.  + sinus pressure and tenderness.  Not getting better. Sleeping okay.   No fevers.     Going to eye doctor regularly. Monitoring cataracts.     History/Other:   Fall Risk Assessment:   She has been screened for Falls and is low risk.      Cognitive Assessment:   She had a completely normal cognitive assessment - see flowsheet entries     Functional Ability/Status:   Xi Galvin has some abnormal functions as listed below:  She has Vision problems based on screening of functional status.       Depression Screening (PHQ-2/PHQ-9): PHQ-2 SCORE: 0  , done 10/3/2024             Advanced Directives:   She does NOT have a Living Will. [Do you have a living will?: No]  She does NOT have a Power of  for Health Care. [Do you have a healthcare power of ?: No]  Not discussed      Patient Active Problem List   Diagnosis    Simple chronic bronchitis (HCC)    Essential hypertension    Gastroesophageal reflux disease without esophagitis    Vitamin D deficiency    Chronic narrow angle glaucoma    Asthma (HCC)    Closed fracture of left ankle    Closed fracture of right tibial plateau    Age-related osteoporosis with current pathological fracture with delayed healing    Osteopenia    Pain     Allergies:  She is allergic to doxycycline and penicillins.    Current Medications:  Outpatient  Medications Marked as Taking for the 10/3/24 encounter (Office Visit) with Lynsey Pettit DO   Medication Sig    HYDROCHLOROTHIAZIDE 12.5 MG Oral Tab TAKE ONE TABLET BY MOUTH DAILY    VENTOLIN  (90 Base) MCG/ACT Inhalation Aero Soln Inhale 2 puffs into the lungs every 6 (six) hours as needed for Wheezing.    ACYCLOVIR 5 % External Ointment Apply 1 Application. topically every 3 (three) hours as needed.    INCRUSE ELLIPTA 62.5 MCG/ACT Inhalation Aerosol Powder, Breath Activated Inhale 1 puff into the lungs every morning.    Budesonide-Formoterol Fumarate 160-4.5 MCG/ACT Inhalation Aerosol Inhale 2 puffs into the lungs 2 (two) times daily.    ipratropium-albuterol 0.5-2.5 (3) MG/3ML Inhalation Solution INHALE ONE VIAL in nebulizer EVERY 4 HOURS AS NEEDED FOR COUGH/WHEEZING    brimonidine 0.2 % Ophthalmic Solution brimonidine 0.2 % eye drops   PLACE 1 DROP IN BOTH EYES TWICE DAILY    latanoprost 0.005 % Ophthalmic Solution Place 2 drops into both eyes nightly.    Vitamin C 500 MG Oral Tab Take 1 tablet (500 mg total) by mouth every morning.    Calcium Citrate-Vitamin D (CITRACAL + D OR) Take 1 tablet by mouth daily.    Cholecalciferol (VITAMIN D) 1000 UNITS Oral Cap Take 5,000 Units by mouth.         Medical History:  She  has a past medical history of COPD (chronic obstructive pulmonary disease) with chronic bronchitis (HCC).  Surgical History:  She  has a past surgical history that includes tonsillectomy and .   Family History:  Her family history includes Hypertension in her father and mother; Other in her mother.  Social History:  She  reports that she quit smoking about 10 years ago. Her smoking use included cigarettes. She started smoking about 30 years ago. She has a 20 pack-year smoking history. She has never used smokeless tobacco. She reports that she does not drink alcohol and does not use drugs.    Tobacco:  She smoked tobacco in the past but quit greater than 12 months ago.  Social History      Tobacco Use   Smoking Status Former    Current packs/day: 0.00    Average packs/day: 1 pack/day for 20.0 years (20.0 ttl pk-yrs)    Types: Cigarettes    Start date: 12/27/1993    Quit date: 12/27/2013    Years since quitting: 10.7   Smokeless Tobacco Never        CAGE Alcohol Screen:   CAGE screening score of 0 on 10/2/2024, showing low risk of alcohol abuse.      Patient Care Team:  Lynsey Pettit DO as PCP - General (Family Medicine)    Review of Systems  GENERAL: feels well otherwise  SKIN: denies any unusual skin lesions  EYES: denies blurred vision or double vision  HEENT: denies nasal congestion, sinus pain or ST  LUNGS: denies shortness of breath with exertion  CARDIOVASCULAR: denies chest pain on exertion  GI: denies abdominal pain, denies heartburn  : denies dysuria, vaginal discharge or itching, no complaint of urinary incontinence   MUSCULOSKELETAL: see HPI   NEURO: denies headaches   PSYCHE: denies depression or anxiety, frustrated some days.   HEMATOLOGIC: denies hx of anemia  ENDOCRINE: denies thyroid history  ALL/ASTHMA: + hx of allergy or asthma     Objective:   Physical Exam  General Appearance:  Alert, cooperative, no distress, appears stated age   Head:  Normocephalic, without obvious abnormality, atraumatic   Eyes:  PERRL, conjunctiva/corneas clear, EOM's intact both eyes   Ears:  Normal TM's and external ear canals, both ears   Nose: Nares normal, septum midline,mucosa normal, no drainage or sinus tenderness   Throat: Lips, mucosa, and tongue normal; teeth and gums normal   Neck: Supple, symmetrical, trachea midline, no adenopathy;  thyroid: not enlarged, symmetric, no tenderness/mass/nodules;     Back:   Symmetric, no curvature, ROM normal, no CVA tenderness   Lungs:   Clear to auscultation bilaterally, respirations unlabored   Heart:  Regular rate and rhythm, S1 and S2 normal, no murmur, rub, or gallop   Abdomen:   Soft, non-tender, bowel sounds active all four quadrants,  no masses,  no organomegaly   Pelvic: Deferred   Extremities: Extremities normal, atraumatic, no cyanosis or edema,    Pulses: 2+ and symmetric   Skin: Skin color, texture, turgor normal, no rashes or lesions   Lymph nodes: Cervical, supraclavicular, and axillary nodes normal   Neurologic: Normal       BP (!) 138/92 (BP Location: Right arm, Patient Position: Sitting, Cuff Size: adult)   Pulse 91   Temp 98 °F (36.7 °C) (Temporal)   Resp 20   Wt 168 lb (76.2 kg)   SpO2 95%   BMI 27.12 kg/m²  Estimated body mass index is 27.12 kg/m² as calculated from the following:    Height as of 10/18/22: 5' 6\" (1.676 m).    Weight as of this encounter: 168 lb (76.2 kg).    Medicare Hearing Assessment:   Hearing Screening    Time taken: 10/3/2024 10:02 AM  Screening Method: Finger Rub  Finger Rub Result: Pass         Visual Acuity:   Right Eye Visual Acuity: Uncorrected Right Eye Chart Acuity: 20/30   Left Eye Visual Acuity: Uncorrected Left Eye Chart Acuity: 20/40   Both Eyes Visual Acuity: Uncorrected Both Eyes Chart Acuity: 20/30            Assessment & Plan:   Xi Galvin is a 74 year old female who presents for a Medicare Assessment.     1. Encounter for annual health examination  Completed today.   - CBC With Differential With Platelet; Future  - Comp Metabolic Panel (14); Future  - Lipid Panel; Future  - CBC With Differential With Platelet  - Comp Metabolic Panel (14)  - Lipid Panel    2. Screening for malignant neoplasm of colon  - Occult Blood, Fecal, Immunoassay (Blue cards) [E]; Future    3. Simple chronic bronchitis (HCC)  Continue symbicort   - Budesonide-Formoterol Fumarate 160-4.5 MCG/ACT Inhalation Aerosol; Inhale 2 puffs into the lungs 2 (two) times daily.  Dispense: 30.6 g; Refill: 3    4. Chronic obstructive pulmonary disease, unspecified COPD type (HCC)  Doing well. Neb refill sent.   - ipratropium-albuterol 0.5-2.5 (3) MG/3ML Inhalation Solution; INHALE ONE VIAL in nebulizer EVERY 4 HOURS AS NEEDED FOR  COUGH/WHEEZING  Dispense: 270 mL; Refill: 0    5. Acute non-recurrent maxillary sinusitis  Will treat sinus infection. She does well with zpack.   - azithromycin 250 MG Oral Tab; Take 2 tablets (500 mg total) by mouth daily for 1 day, THEN 1 tablet (250 mg total) daily for 4 days.  Dispense: 6 tablet; Refill: 0    6. Hyperglycemia  Check A1c.   - Hemoglobin A1C; Future  - Hemoglobin A1C    7. Vitamin D deficiency  Feeling well. Last vit D was nice and high.     8. Pain  Doing well, no pain meds. Monitor.     9. Osteopenia, unspecified location  Due for dexa next year.     10. Gastroesophageal reflux disease without esophagitis  Stable.     11. Essential hypertension  A little high today. Continue hydrochlorothiazide.     12. Closed fracture of right tibial plateau, initial encounter  Healed. Now walking normally.     13. Closed fracture of left ankle with routine healing, subsequent encounter  Better, still has some discomfort in ankle at times.e     14. Chronic narrow angle glaucoma  Following closely with ophthalmology.     15. Moderate persistent asthma with acute exacerbation (HCC)  Inhalers, see above.     16. Age-related osteoporosis with current pathological fracture with delayed healing  Pt has thought about prolia.       The patient indicates understanding of these issues and agrees to the plan.  Reinforced healthy diet, lifestyle, and exercise.      No follow-ups on file.     Lynsey Pettit DO, 10/23/2023     Supplementary Documentation:   General Health:  In the past six months, have you lost more than 10 pounds without trying?: 2 - No  Has your appetite been poor?: No  Type of Diet: Balanced  How does the patient maintain a good energy level?: Other  How would you describe your daily physical activity?: Moderate  How would you describe your current health state?: Good  How do you maintain positive mental well-being?: Social Interaction;Games;Visiting Friends;Visiting Family  On a scale of 0 to 10, with 0  being no pain and 10 being severe pain, what is your pain level?: 2 - (Mild)  In the past six months, have you experienced urine leakage?: 0-No  At any time do you feel concerned for the safety/well-being of yourself and/or your children, in your home or elsewhere?: No  Have you had any immunizations at another office such as Influenza, Hepatitis B, Tetanus, or Pneumococcal?: No       Xi Galvin's SCREENING SCHEDULE   Tests on this list are recommended by your physician but may not be covered, or covered at this frequency, by your insurer.   Please check with your insurance carrier before scheduling to verify coverage.   PREVENTATIVE SERVICES FREQUENCY &  COVERAGE DETAILS LAST COMPLETION DATE   Diabetes Screening    Fasting Blood Sugar /  Glucose    One screening every 12 months if never tested or if previously tested but not diagnosed with pre-diabetes   One screening every 6 months if diagnosed with pre-diabetes Lab Results   Component Value Date    GLU 92 01/29/2024        Cardiovascular Disease Screening    Lipid Panel  Cholesterol  Lipoprotein (HDL)  Triglycerides Covered every 5 years for all Medicare beneficiaries without apparent signs or symptoms of cardiovascular disease Lab Results   Component Value Date    CHOLEST 175 10/23/2023    HDL 75 (H) 10/23/2023    LDL 88 10/23/2023    TRIG 64 10/23/2023         Electrocardiogram (EKG)   Covered if needed at Welcome to Medicare, and non-screening if indicated for medical reasons 01/30/2024      Ultrasound Screening for Abdominal Aortic Aneurysm (AAA) Covered once in a lifetime for one of the following risk factors    Men who are 65-75 years old and have ever smoked    Anyone with a family history -     Colorectal Cancer Screening  Covered for ages 50-85; only need ONE of the following:    Colonoscopy   Covered every 10 years    Covered every 2 years if patient is at high risk or previous colonoscopy was abnormal 10/14/2019    Health Maintenance   Topic Date  Due    Colorectal Cancer Screening  10/14/2020       Flexible Sigmoidoscopy   Covered every 4 years -    Fecal Occult Blood Test Covered annually -   Bone Density Screening    Bone density screening    Covered every 2 years after age 65 if diagnosed with risk of osteoporosis or estrogen deficiency.    Covered yearly for long-term glucocorticoid medication use (Steroids) Last Dexa Scan:    XR DEXA BONE DENSITOMETRY (CPT=77080) 11/11/2022      No recommendations at this time   Pap and Pelvic    Pap   Covered every 2 years for women at normal risk; Annually if at high risk 10/12/2020  Health Maintenance   Topic Date Due    Pap Smear  10/12/2025       Chlamydia Annually if high risk -  No recommendations at this time   Screening Mammogram    Mammogram     Recommend annually for all female patients aged 40 and older    One baseline mammogram covered for patients aged 35-39 10/28/2022    Health Maintenance   Topic Date Due    Mammogram  10/28/2024       Immunizations    Influenza Covered once per flu season  Please get every year -  Influenza Vaccine(1) due on 10/01/2024    Pneumococcal Each vaccine (Phanhep56 & [id number]) covered once after 65 Prevnar 13: 02/06/2019    [id number]: 02/14/2018     No recommendations at this time    Hepatitis B One screening covered for patients with certain risk factors   -  No recommendations at this time    Tetanus Toxoid Not covered by Medicare Part B unless medically necessary (cut with metal); may be covered with your pharmacy prescription benefits -    Tetanus, Diptheria and Pertusis TD and TDaP Not covered by Medicare Part B -  No recommendations at this time    Zoster Not covered by Medicare Part B; may be covered with your pharmacy  prescription benefits -  No recommendations at this time     Annual Monitoring of Persistent Medications (ACE/ARB, digoxin diuretics, anticonvulsants)    Potassium Annually Lab Results   Component Value Date    K 3.9 01/29/2024         Creatinine    Annually Lab Results   Component Value Date    CREATSERUM 1.28 (H) 01/29/2024         BUN Annually Lab Results   Component Value Date    BUN 14 01/29/2024       Drug Serum Conc Annually No results found for: \"DIGOXIN\", \"DIG\", \"VALP\"           Chronic Obstructive Pulmonary Disease (COPD)    Spirometry Annually Spirometry date: 06/25/2018

## 2024-10-04 ENCOUNTER — TELEPHONE (OUTPATIENT)
Dept: FAMILY MEDICINE CLINIC | Facility: CLINIC | Age: 74
End: 2024-10-04

## 2024-10-04 NOTE — TELEPHONE ENCOUNTER
Patient calling, states antibiotic is working on head congestion, patient states she overall feels better.

## 2024-10-07 RX ORDER — UMECLIDINIUM 62.5 UG/1
1 AEROSOL, POWDER ORAL EVERY MORNING
Qty: 90 EACH | Refills: 0 | Status: SHIPPED | OUTPATIENT
Start: 2024-10-07

## 2024-10-07 NOTE — TELEPHONE ENCOUNTER
Incruse Ellipta 62.5 Mcg/Inh Inh Glax     Pt failed refill protocol for the following reasons:    Asthma & COPD Medication Protocol Puljdy00/07/2024 09:45 AM   Protocol Details Asthma Action Score greater than or equal to 20    AAP/ACT given in last 12 months    Appointment in past 6 or next 3 months        Last refill: 7/11/2024, for #90, 0 refill  Last appt: 10/3/2024  Last Px:10/3/2024  Next appt: N/A    Forward to Dr. Chaparrita Do, please advise on refills. Thank you.

## 2024-10-21 ENCOUNTER — TELEPHONE (OUTPATIENT)
Dept: FAMILY MEDICINE CLINIC | Facility: CLINIC | Age: 74
End: 2024-10-21

## 2024-10-21 DIAGNOSIS — M79.646 PAIN OF FINGER, UNSPECIFIED LATERALITY: Primary | ICD-10-CM

## 2024-10-21 DIAGNOSIS — M67.449 GANGLION CYST OF FINGER: ICD-10-CM

## 2024-10-21 NOTE — TELEPHONE ENCOUNTER
Pt in office with . Lump on finger is getting larger and bothering her. It hurts and locks. Would like to get it taken care of.

## 2024-11-18 ENCOUNTER — HOSPITAL ENCOUNTER (OUTPATIENT)
Dept: GENERAL RADIOLOGY | Age: 74
Discharge: HOME | End: 2024-11-18
Attending: FAMILY MEDICINE
Payer: MEDICARE

## 2024-11-18 ENCOUNTER — OFFICE VISIT (OUTPATIENT)
Dept: FAMILY MEDICINE CLINIC | Facility: CLINIC | Age: 74
End: 2024-11-18
Payer: MEDICARE

## 2024-11-18 VITALS
TEMPERATURE: 97 F | HEART RATE: 76 BPM | BODY MASS INDEX: 28 KG/M2 | DIASTOLIC BLOOD PRESSURE: 70 MMHG | WEIGHT: 170.63 LBS | RESPIRATION RATE: 18 BRPM | OXYGEN SATURATION: 95 % | SYSTOLIC BLOOD PRESSURE: 130 MMHG

## 2024-11-18 DIAGNOSIS — J41.0 SIMPLE CHRONIC BRONCHITIS (HCC): ICD-10-CM

## 2024-11-18 DIAGNOSIS — R05.2 SUBACUTE COUGH: Primary | ICD-10-CM

## 2024-11-18 DIAGNOSIS — R05.2 SUBACUTE COUGH: ICD-10-CM

## 2024-11-18 DIAGNOSIS — R07.89 CHEST TIGHTNESS: ICD-10-CM

## 2024-11-18 PROCEDURE — 71046 X-RAY EXAM CHEST 2 VIEWS: CPT | Performed by: FAMILY MEDICINE

## 2024-11-18 RX ORDER — PREDNISONE 20 MG/1
40 TABLET ORAL DAILY
Qty: 10 TABLET | Refills: 0 | Status: SHIPPED | OUTPATIENT
Start: 2024-11-18 | End: 2024-11-23

## 2024-11-18 NOTE — PROGRESS NOTES
Xi Galvin is a 74 year old female.  Chief Complaint   Patient presents with    Sinus Problem       HPI:   Has had some congestion. I treated her with zpack beg of October. It went away.   Then end of October started with sneezing, sinus pressure, drippy nose (always).   Feels some cracking in throat with coughing. Looser than it was.   Has tried allegra, robutussin dm,   Taking 2 daily inhalers daily.   Not taking albuterol.   Gets a little SOB going up stairs, but not wheezing or tight.   Coughing up yellowish sputum.   No fevers.   No headaches or body aches.   No GI symptoms.   Sleeping all night.     ALLERGIES:  Allergies[1]      Current Outpatient Medications   Medication Sig Dispense Refill    predniSONE 20 MG Oral Tab Take 2 tablets (40 mg total) by mouth daily for 5 days. 10 tablet 0    INCRUSE ELLIPTA 62.5 MCG/ACT Inhalation Aerosol Powder, Breath Activated Inhale 1 puff into the lungs every morning. 90 each 0    Budesonide-Formoterol Fumarate 160-4.5 MCG/ACT Inhalation Aerosol Inhale 2 puffs into the lungs 2 (two) times daily. 30.6 g 3    ipratropium-albuterol 0.5-2.5 (3) MG/3ML Inhalation Solution INHALE ONE VIAL in nebulizer EVERY 4 HOURS AS NEEDED FOR COUGH/WHEEZING 270 mL 0    HYDROCHLOROTHIAZIDE 12.5 MG Oral Tab TAKE ONE TABLET BY MOUTH DAILY 90 tablet 0    VENTOLIN  (90 Base) MCG/ACT Inhalation Aero Soln Inhale 2 puffs into the lungs every 6 (six) hours as needed for Wheezing. 18 g 0    ACYCLOVIR 5 % External Ointment Apply 1 Application. topically every 3 (three) hours as needed. 15 g 0    brimonidine 0.2 % Ophthalmic Solution brimonidine 0.2 % eye drops   PLACE 1 DROP IN BOTH EYES TWICE DAILY      latanoprost 0.005 % Ophthalmic Solution Place 2 drops into both eyes nightly.  1    Vitamin C 500 MG Oral Tab Take 1 tablet (500 mg total) by mouth every morning.      Calcium Citrate-Vitamin D (CITRACAL + D OR) Take 1 tablet by mouth daily.      Cholecalciferol (VITAMIN D) 1000 UNITS Oral  Cap Take 5,000 Units by mouth.          Past Medical History:    COPD (chronic obstructive pulmonary disease) with chronic bronchitis (HCC)      Social History:  Social History     Socioeconomic History    Marital status:    Tobacco Use    Smoking status: Former     Current packs/day: 0.00     Average packs/day: 1 pack/day for 20.0 years (20.0 ttl pk-yrs)     Types: Cigarettes     Start date: 12/27/1993     Quit date: 12/27/2013     Years since quitting: 10.9    Smokeless tobacco: Never   Vaping Use    Vaping status: Never Used   Substance and Sexual Activity    Alcohol use: No     Alcohol/week: 0.0 standard drinks of alcohol    Drug use: No     Social Drivers of Health     Financial Resource Strain: Low Risk  (8/20/2023)    Received from [facility], [facility]    Financial Resource Strain     In the past year, have you or any family members you live with been unable to get any of the following when it was really needed? Check all that apply.: None   Food Insecurity: Not At Risk (8/20/2023)    Received from [facility], [facility]    Food Insecurity     RETIRE How often in the past 12 months would you say you are worried or stressed about having enough money to buy nutritious meals? : Never   Transportation Needs: No Transportation Needs (8/20/2023)    [facility], [facility]    PRAPARE - Transportation     In the past 12 months, has lack of transportation kept you from medical appointments or from getting medications?: No     In the past 12 months, has lack of transportation kept you from meetings, work, or from getting things needed for daily living?: No   Social Connections: Low Risk  (8/20/2023)    [facility]    Social Connections     How often do you see or talk to people that you care about and feel close to? (For example:  talking to friends on the phone, visiting friends or family, going to [employer] or club meetings): 3 to 5 times a week    [facility] from [facility], [facility]    Housing Stability        BP Readings from Last 6 Encounters:   11/18/24 130/70   10/03/24 (!) 138/92   01/10/24 126/70   10/23/23 134/82   03/01/23 130/90   07/15/22 134/76       Wt Readings from Last 6 Encounters:   11/18/24 170 lb 9.6 oz (77.4 kg)   10/03/24 168 lb (76.2 kg)   01/29/24 163 lb (73.9 kg)   01/10/24 161 lb 2 oz (73.1 kg)   10/23/23 167 lb 2 oz (75.8 kg)   03/01/23 170 lb (77.1 kg)       REVIEW OF SYSTEMS:   GENERAL HEALTH: feels well no complaints  SKIN: denies any unusual skin lesions or rashes  RESPIRATORY: +  shortness of breath with exertion  CARDIOVASCULAR: denies chest pain on exertion  GI: denies abdominal pain and denies heartburn  NEURO: denies headaches    EXAM:   /70   Pulse 76   Temp 97 °F (36.1 °C) (Temporal)   Resp 18   Wt 170 lb 9.6 oz (77.4 kg)   SpO2 95%   BMI 27.54 kg/m²  Body mass index is 27.54 kg/m².      GENERAL: well developed, well nourished,in no apparent distress  SKIN: no rashes,no suspicious lesions  HEENT: atraumatic, normocephalic,ears and throat are clear, no sinus tenderness   NECK: supple,no adenopathy,   LUNGS:  diminished, but no rales or wheezing   CARDIO: RRR without murmur  GI: good BS's,no masses, HSM or tenderness  EXTREMITIES: no cyanosis, clubbing or edema    ASSESSMENT AND PLAN:     Encounter Diagnoses   Name Primary?    Subacute cough Yes    Chest tightness     Simple chronic bronchitis (HCC)        Diagnoses and all orders for this visit:    Subacute cough  -     XR CHEST PA + LAT CHEST (CPT=71046); Future    Chest tightness  -     predniSONE 20 MG Oral Tab; Take 2 tablets (40 mg total) by mouth daily for 5 days.    Simple chronic bronchitis (HCC)    Prednisone burst, add abx if CXR positive. RTC if worsening or not better in the next week.     No  orders of the defined types were placed in this encounter.              Meds & Refills for this Visit:  Requested Prescriptions     Signed Prescriptions Disp Refills    predniSONE 20 MG Oral Tab 10 tablet 0     Sig: Take 2 tablets (40 mg total) by mouth daily for 5 days.             The patient indicates understanding of these issues and agrees to the plan.               [1]   Allergies  Allergen Reactions    Doxycycline NAUSEA AND VOMITING    Penicillins DIARRHEA

## 2024-11-20 ENCOUNTER — TELEPHONE (OUTPATIENT)
Dept: FAMILY MEDICINE CLINIC | Facility: CLINIC | Age: 74
End: 2024-11-20

## 2024-11-20 NOTE — TELEPHONE ENCOUNTER
Letter mailed to patient reminding her she has outstanding orders.    Lab Frequency Next Occurrence   Occult Blood, Fecal, Immunoassay (Blue cards) [E] Once 10/03/2024

## 2024-11-26 ENCOUNTER — TELEPHONE (OUTPATIENT)
Facility: CLINIC | Age: 74
End: 2024-11-26

## 2024-11-26 NOTE — TELEPHONE ENCOUNTER
New pt rt hand possible cyst no imaging avail please advise  Future Appointments  1/17/2025  1:30 PM    Joe Peters MD          [id number]

## 2024-12-02 ENCOUNTER — OFFICE VISIT (OUTPATIENT)
Dept: FAMILY MEDICINE CLINIC | Facility: CLINIC | Age: 74
End: 2024-12-02
Payer: MEDICARE

## 2024-12-02 VITALS
WEIGHT: 173 LBS | SYSTOLIC BLOOD PRESSURE: 136 MMHG | TEMPERATURE: 97 F | BODY MASS INDEX: 28 KG/M2 | RESPIRATION RATE: 22 BRPM | HEART RATE: 101 BPM | DIASTOLIC BLOOD PRESSURE: 82 MMHG | OXYGEN SATURATION: 96 %

## 2024-12-02 DIAGNOSIS — J44.9 CHRONIC OBSTRUCTIVE PULMONARY DISEASE, UNSPECIFIED COPD TYPE (HCC): ICD-10-CM

## 2024-12-02 DIAGNOSIS — H65.91 RIGHT NON-SUPPURATIVE OTITIS MEDIA: ICD-10-CM

## 2024-12-02 DIAGNOSIS — J01.00 ACUTE NON-RECURRENT MAXILLARY SINUSITIS: Primary | ICD-10-CM

## 2024-12-02 PROCEDURE — 99214 OFFICE O/P EST MOD 30 MIN: CPT | Performed by: FAMILY MEDICINE

## 2024-12-02 RX ORDER — IPRATROPIUM BROMIDE AND ALBUTEROL SULFATE 2.5; .5 MG/3ML; MG/3ML
SOLUTION RESPIRATORY (INHALATION)
Qty: 270 ML | Refills: 0 | Status: SHIPPED | OUTPATIENT
Start: 2024-12-02

## 2024-12-02 RX ORDER — PREDNISONE 10 MG/1
TABLET ORAL
Qty: 30 TABLET | Refills: 0 | Status: SHIPPED | OUTPATIENT
Start: 2024-12-02

## 2024-12-02 RX ORDER — CEFDINIR 300 MG/1
300 CAPSULE ORAL 2 TIMES DAILY
Qty: 20 CAPSULE | Refills: 0 | Status: SHIPPED | OUTPATIENT
Start: 2024-12-02 | End: 2024-12-12

## 2024-12-02 NOTE — PROGRESS NOTES
Xi Galvin is a 74 year old female.  Chief Complaint   Patient presents with    Follow - Up     Still having issues        HPI:   Still coughing. Feels like she is not getting over this.   Was better with prednisone. Finished that 2 weeks ago. It was better, but not gone.   Then started getting sick again.   Had a similar thing in October, I gave her a zpack. Was better, then came back.   Gets SOB when up and doing things. Resolves on it's own.   Needs new neb solution. Taking that at least daily.   Was taking nasosnex in the morning and allegra at night. Not helping.     ALLERGIES:  Allergies[1]      Current Outpatient Medications   Medication Sig Dispense Refill    cefdinir 300 MG Oral Cap Take 1 capsule (300 mg total) by mouth 2 (two) times daily for 10 days. 20 capsule 0    predniSONE 10 MG Oral Tab 4 tabs (40 mg) on days 1-3, 3 tabs on days 4-6, 2 tabs on days 7-9, 1 tab on day 10-12, then stop 30 tablet 0    ipratropium-albuterol 0.5-2.5 (3) MG/3ML Inhalation Solution INHALE ONE VIAL in nebulizer EVERY 4 HOURS AS NEEDED FOR COUGH/WHEEZING 270 mL 0    INCRUSE ELLIPTA 62.5 MCG/ACT Inhalation Aerosol Powder, Breath Activated Inhale 1 puff into the lungs every morning. 90 each 0    Budesonide-Formoterol Fumarate 160-4.5 MCG/ACT Inhalation Aerosol Inhale 2 puffs into the lungs 2 (two) times daily. 30.6 g 3    HYDROCHLOROTHIAZIDE 12.5 MG Oral Tab TAKE ONE TABLET BY MOUTH DAILY 90 tablet 0    VENTOLIN  (90 Base) MCG/ACT Inhalation Aero Soln Inhale 2 puffs into the lungs every 6 (six) hours as needed for Wheezing. 18 g 0    ACYCLOVIR 5 % External Ointment Apply 1 Application. topically every 3 (three) hours as needed. 15 g 0    brimonidine 0.2 % Ophthalmic Solution brimonidine 0.2 % eye drops   PLACE 1 DROP IN BOTH EYES TWICE DAILY      latanoprost 0.005 % Ophthalmic Solution Place 2 drops into both eyes nightly.  1    Vitamin C 500 MG Oral Tab Take 1 tablet (500 mg total) by mouth every morning.       Calcium Citrate-Vitamin D (CITRACAL + D OR) Take 1 tablet by mouth daily.      Cholecalciferol (VITAMIN D) 1000 UNITS Oral Cap Take 5,000 Units by mouth.          Past Medical History:    COPD (chronic obstructive pulmonary disease) with chronic bronchitis (HCC)      Social History:  Social History     Socioeconomic History    Marital status:    Tobacco Use    Smoking status: Former     Current packs/day: 0.00     Average packs/day: 1 pack/day for 20.0 years (20.0 ttl pk-yrs)     Types: Cigarettes     Start date: 12/27/1993     Quit date: 12/27/2013     Years since quitting: 10.9    Smokeless tobacco: Never   Vaping Use    Vaping status: Never Used   Substance and Sexual Activity    Alcohol use: No     Alcohol/week: 0.0 standard drinks of alcohol    Drug use: No     Social Drivers of Health     Financial Resource Strain: Low Risk  (8/20/2023)    Received from [facility] [facility], [facility]    Financial Resource Strain     In the past year, have you or any family members you live with been unable to get any of the following when it was really needed? Check all that apply.: None   Food Insecurity: Not At Risk (8/20/2023)    [facility] from [facility], [facility]    Food Insecurity     RETIRE How often in the past 12 months would you say you are worried or stressed about having enough money to buy nutritious meals? : Never   Transportation Needs: No Transportation Needs (8/20/2023)    Received from [facility] [facility], [facility], [facility]    PRAPARE - Transportation     In the past 12 months, has lack of transportation kept you from medical appointments or from getting medications?: No     In the past 12 months, has lack of transportation kept you from meetings, work, or from getting things needed for daily living?: No   Social Connections: Low Risk  (8/20/2023)    Received from [facility], [facility]    Social Connections     How often do you see or talk to people that you care about and feel close to? (For example: talking to friends on the phone, visiting friends or family, going to Denominational or club meetings): 3 to 5 times a week    [facility], [facility]    Housing Stability        BP Readings from Last 6 Encounters:   12/02/24 136/82   11/18/24 130/70   10/03/24 (!) 138/92   01/10/24 126/70   10/23/23 134/82   03/01/23 130/90       Wt Readings from Last 6 Encounters:   12/02/24 173 lb (78.5 kg)   11/18/24 170 lb 9.6 oz (77.4 kg)   10/03/24 168 lb (76.2 kg)   01/29/24 163 lb (73.9 kg)   01/10/24 161 lb 2 oz (73.1 kg)   10/23/23 167 lb 2 oz (75.8 kg)       REVIEW OF SYSTEMS:   GENERAL HEALTH: feels well no complaints other than above   SKIN: denies any unusual skin lesions or rashes  RESPIRATORY: denies shortness of breath    CARDIOVASCULAR: denies chest pain on exertion  GI: denies abdominal pain and denies heartburn  NEURO: denies headaches    EXAM:   /82   Pulse 101   Temp 97.1 °F (36.2 °C) (Temporal)   Resp 22   Wt 173 lb (78.5 kg)   SpO2 96%   BMI 27.92 kg/m²  Body mass index is 27.92 kg/m².      GENERAL: well developed, well nourished,in no apparent distress  SKIN: no rashes,no suspicious lesions  HEENT: atraumatic, normocephalic, + right TM bulging and off color, + sinus tenderness.   NECK: supple,no adenopathy,   LUNGS: clear to auscultation, no rales or wheezing, but decreased air movement diffusely.   CARDIO: RRR without murmur  GI: good BS's,no masses, HSM or tenderness  EXTREMITIES: no cyanosis, clubbing or edema     ASSESSMENT AND PLAN:     Encounter Diagnoses   Name Primary?    Acute non-recurrent maxillary sinusitis Yes    Chronic obstructive pulmonary disease, unspecified COPD type (HCC)     Right non-suppurative otitis media        Diagnoses and all orders for this visit:    Acute non-recurrent maxillary sinusitis  -      cefdinir 300 MG Oral Cap; Take 1 capsule (300 mg total) by mouth 2 (two) times daily for 10 days.  -     predniSONE 10 MG Oral Tab; 4 tabs (40 mg) on days 1-3, 3 tabs on days 4-6, 2 tabs on days 7-9, 1 tab on day 10-12, then stop    Chronic obstructive pulmonary disease, unspecified COPD type (HCC)  -     ipratropium-albuterol 0.5-2.5 (3) MG/3ML Inhalation Solution; INHALE ONE VIAL in nebulizer EVERY 4 HOURS AS NEEDED FOR COUGH/WHEEZING    Right non-suppurative otitis media  -     cefdinir 300 MG Oral Cap; Take 1 capsule (300 mg total) by mouth 2 (two) times daily for 10 days.    Treat as above.   If not getting better, would do CT chest.   Follow up in 3-4 weeks.     No orders of the defined types were placed in this encounter.              Meds & Refills for this Visit:  Requested Prescriptions     Signed Prescriptions Disp Refills    cefdinir 300 MG Oral Cap 20 capsule 0     Sig: Take 1 capsule (300 mg total) by mouth 2 (two) times daily for 10 days.    predniSONE 10 MG Oral Tab 30 tablet 0     Si tabs (40 mg) on days 1-3, 3 tabs on days 4-6, 2 tabs on days 7-9, 1 tab on day 10-12, then stop    ipratropium-albuterol 0.5-2.5 (3) MG/3ML Inhalation Solution 270 mL 0     Sig: INHALE ONE VIAL in nebulizer EVERY 4 HOURS AS NEEDED FOR COUGH/WHEEZING             The patient indicates understanding of these issues and agrees to the plan.               [1]   Allergies  Allergen Reactions    Doxycycline NAUSEA AND VOMITING    Penicillins DIARRHEA

## 2024-12-04 ENCOUNTER — NURSE ONLY (OUTPATIENT)
Dept: FAMILY MEDICINE CLINIC | Facility: CLINIC | Age: 74
End: 2024-12-04
Payer: MEDICARE

## 2024-12-04 DIAGNOSIS — Z12.11 SCREENING FOR MALIGNANT NEOPLASM OF COLON: ICD-10-CM

## 2024-12-04 LAB — HEMOCCULT STL QL: NEGATIVE

## 2024-12-04 PROCEDURE — 82274 ASSAY TEST FOR BLOOD FECAL: CPT | Performed by: FAMILY MEDICINE

## 2024-12-10 ENCOUNTER — TELEPHONE (OUTPATIENT)
Dept: FAMILY MEDICINE CLINIC | Facility: CLINIC | Age: 74
End: 2024-12-10

## 2024-12-10 DIAGNOSIS — R05.3 CHRONIC COUGH: ICD-10-CM

## 2024-12-10 DIAGNOSIS — R07.89 CHEST TIGHTNESS: ICD-10-CM

## 2024-12-10 DIAGNOSIS — J44.9 CHRONIC OBSTRUCTIVE PULMONARY DISEASE, UNSPECIFIED COPD TYPE (HCC): Primary | ICD-10-CM

## 2024-12-10 NOTE — TELEPHONE ENCOUNTER
Patient is on day 7 of antibiotics and steroids    Little to no improvement  Feels her cough is worse    Very little production when coughs, tight    Please adv  Thank you

## 2024-12-10 NOTE — TELEPHONE ENCOUNTER
Patient states she is not feeling worse, just not getting better.  States at times her cough will get worse. States she feels like there is junk in her head that lets loose and she starts coughing. States cough is not constant but will flare up.    States she feels tighter up high, just below her collar bone.  Sinus drainage at times,   Feels it in the back of her throat  Breathing treatment and robitussin helped last night     O2 levels 94  Gets winded going up stairs. Sits and it gets better.     States this has been going on since October and typically steroid and antibiotics will get rid of it but this time it is persisting.    Looking for further recommendations.

## 2024-12-10 NOTE — TELEPHONE ENCOUNTER
Discussed with Dr Pettit who states if feeling worse, needs ER evaluation.    Left message on voicemail/answering machine for patient to call office

## 2024-12-10 NOTE — TELEPHONE ENCOUNTER
Patient notified and verbalized understanding  States she has number to central scheduling to schedule CT  Number to pulm provided to schedule with Dr Lassiter  Patient advised to be seen in ER if worsening

## 2024-12-10 NOTE — TELEPHONE ENCOUNTER
She can do the neb every 4-6 hours.   I put in a referral for pulm. I don't know what else to do since we've already given her steroids, antibiotics, nebs.     I also put in an order for CT chest to see if there is anything else in there going on.

## 2024-12-14 DIAGNOSIS — J44.9 CHRONIC OBSTRUCTIVE PULMONARY DISEASE, UNSPECIFIED COPD TYPE (HCC): ICD-10-CM

## 2024-12-14 RX ORDER — IPRATROPIUM BROMIDE AND ALBUTEROL SULFATE 2.5; .5 MG/3ML; MG/3ML
SOLUTION RESPIRATORY (INHALATION)
Qty: 270 ML | Refills: 0 | OUTPATIENT
Start: 2024-12-14

## 2024-12-14 RX ORDER — HYDROCHLOROTHIAZIDE 12.5 MG/1
12.5 TABLET ORAL DAILY
Qty: 90 TABLET | Refills: 0 | Status: SHIPPED | OUTPATIENT
Start: 2024-12-14

## 2024-12-14 NOTE — TELEPHONE ENCOUNTER
Hypertension Medications Protocol [id number]/14/2024 09:13 AM   Protocol Details CMP or BMP in past 12 months    Last BP reading less than 140/90    In person appointment or virtual visit in the past 12 mos or appointment in next 3 mos    EGFRCR or GFRNAA > 50     Refilled per protocol  HYDROCHLOROTHIAZIDE 12.5 MG Oral Tab   Last refilled on 9/12/24 #90 with 0 rf.  LOV- 12/2/24  Last labs- 10/3/24    Sent to pharmacy

## 2024-12-27 ENCOUNTER — HOSPITAL ENCOUNTER (OUTPATIENT)
Dept: CT IMAGING | Age: 74
Discharge: HOME | End: 2024-12-27
Attending: FAMILY MEDICINE
Payer: MEDICARE

## 2024-12-27 DIAGNOSIS — R05.3 CHRONIC COUGH: ICD-10-CM

## 2024-12-27 DIAGNOSIS — J44.9 CHRONIC OBSTRUCTIVE PULMONARY DISEASE, UNSPECIFIED COPD TYPE (HCC): ICD-10-CM

## 2024-12-27 DIAGNOSIS — R07.89 CHEST TIGHTNESS: ICD-10-CM

## 2024-12-27 PROCEDURE — 71250 CT THORAX DX C-: CPT | Performed by: FAMILY MEDICINE

## 2024-12-28 ENCOUNTER — TELEPHONE (OUTPATIENT)
Dept: FAMILY MEDICINE CLINIC | Facility: CLINIC | Age: 74
End: 2024-12-28

## 2024-12-28 DIAGNOSIS — I31.39 PERICARDIAL EFFUSION (HCC): Primary | ICD-10-CM

## 2024-12-28 DIAGNOSIS — R07.89 CHEST TIGHTNESS: Primary | ICD-10-CM

## 2024-12-28 NOTE — TELEPHONE ENCOUNTER
Patient reports same symptoms like she had in October  Coughing, drainage, \"junk in head,\" ears     Just completed 10 days of abx and prednisone - started 12/2/24 - completed around 12/12    Patient reports earliest she can get it with Pulm is not until February   Patient reports has been doing breathing treatments, robitussin DM, Nasocort as needed     Patient looking for recommendations   Stated cannot keep coughing like this until she sees Pulm in February  Patient reports Dr. Lassiter only seeing patients with lung cancer? So patient has appt with other pulm    Advised patient to call Pulm office to ask to be placed on wait list - she verbalized understanding.    Advised patient that Dr. Pettit not in office today, will have covering provider to address - she verbalized understanding.    Please advise, thank you

## 2024-12-28 NOTE — TELEPHONE ENCOUNTER
Advised patient of Dr. Langford's note below. Patient verbalized understanding. No further questions at this time.    Please advise, thank you

## 2024-12-28 NOTE — TELEPHONE ENCOUNTER
----- Message from Lynsey Pettit sent at 2024 10:43 AM CST -----  Pls call: Xi, your CT shows no pneumonia, no mass, no fluid on the lungs. There is some chronic changes consistent with COPD. I recommend you see pulmonology with your ongoing symptoms.   There was also a small amount of fluid around the heart. This is likely insignificant, but lets do an ECHO to check your heart function and make sure. You can schedule that by [phone].  - Dr. Hodan Layne, your CT shows no pneumonia, no mass, no fluid on the lungs. There is some chronic changes consistent with COPD. I recommend you see pulmonology with your ongoing symptoms.  There was also a small amount of fluid around the heart. This is likely insignificant, but lets do an ECHO to check your heart function and make sure. You can schedule that by [phone].  - Dr. Pettit   Written by Lynsey Pettit, DO on 2024 10:43 AM CST  Seen by patient Xi Galvin on 2024 10:47 AM

## 2024-12-30 RX ORDER — PREDNISONE 20 MG/1
TABLET ORAL
Qty: 20 TABLET | Refills: 0 | Status: SHIPPED | OUTPATIENT
Start: 2024-12-30 | End: 2025-01-12

## 2024-12-30 RX ORDER — AZITHROMYCIN 250 MG/1
TABLET, FILM COATED ORAL
Qty: 6 TABLET | Refills: 0 | Status: SHIPPED | OUTPATIENT
Start: 2024-12-30 | End: 2025-01-04

## 2024-12-30 NOTE — TELEPHONE ENCOUNTER
Definitely continue inhalers, daily controller and the albuterol nebs as needed.     Is she still feeling tight in the chest or wheezing? If so, I can do a longer steroid taper.     I'm sorry this is frustrating. She can call around to other pulm offices to see if anyone else has sooner openings.

## 2024-12-30 NOTE — TELEPHONE ENCOUNTER
States she feels it more in the upper chest.  O2 varies from 90-95. Has dipped to 87. States lower when she initially puts the oximeter on.  Still sob and fatigued.  States she has an itchy irritated feeling in her throat due to PND.  Right ear is stuffy. Slight runny nose.    Offered appt with NP if would like evaluation.  Patient would like to try prednisone

## 2024-12-30 NOTE — TELEPHONE ENCOUNTER
I sent in a steroid taper and a zpack. Lets see if that helps.   Keep appointment with pulm in the mean time.

## 2025-01-02 ENCOUNTER — HOSPITAL ENCOUNTER (OUTPATIENT)
Dept: CV DIAGNOSTICS | Age: 75
Discharge: HOME | End: 2025-01-02
Attending: FAMILY MEDICINE
Payer: MEDICARE

## 2025-01-02 ENCOUNTER — HOSPITAL ENCOUNTER (OUTPATIENT)
Dept: CT IMAGING | Age: 75
End: 2025-01-02
Attending: FAMILY MEDICINE
Payer: MEDICARE

## 2025-01-02 DIAGNOSIS — I31.39 PERICARDIAL EFFUSION (HCC): ICD-10-CM

## 2025-01-02 PROCEDURE — 93306 TTE W/DOPPLER COMPLETE: CPT | Performed by: FAMILY MEDICINE

## 2025-01-02 RX ORDER — UMECLIDINIUM 62.5 UG/1
AEROSOL, POWDER ORAL
Qty: 90 EACH | Refills: 0 | Status: SHIPPED | OUTPATIENT
Start: 2025-01-02

## 2025-01-02 NOTE — TELEPHONE ENCOUNTER
Asthma & COPD Medication Protocol Lkzmfh2501/01/2025 09:25 AM   Protocol Details ACT Score greater than or equal to 20    ACT recorded in the last 12 months    Appointment in past 6 or next 3 months          Last refill:   INCRUSE ELLIPTA 62.5 MCG/ACT Inhalation Aerosol Powder, Breath Activated 90 each 0 10/7/2024     Last Visit: 12/2/24    Next Visit:   Future Appointments   Date Time Provider Department Center   1/2/2025  3:15 PM [facility] STR[facility] ECHO RM 1 [facility] [place]   1/17/2025  1:30 PM Joe Peters MD [id number]   2/7/2025 11:30 AM Shaun Motley MD [id number] [place]         Forward to Dr. Pettit please advise on refills. Thanks.

## 2025-01-17 ENCOUNTER — TELEPHONE (OUTPATIENT)
Dept: ORTHOPEDICS CLINIC | Facility: CLINIC | Age: 75
End: 2025-01-17

## 2025-01-24 ENCOUNTER — HOSPITAL ENCOUNTER (OUTPATIENT)
Dept: GENERAL RADIOLOGY | Age: 75
Discharge: HOME | End: 2025-01-24
Attending: ORTHOPAEDIC SURGERY
Payer: MEDICARE

## 2025-01-24 ENCOUNTER — OFFICE VISIT (OUTPATIENT)
Dept: ORTHOPEDICS CLINIC | Facility: CLINIC | Age: 75
End: 2025-01-24
Payer: MEDICARE

## 2025-01-24 VITALS — BODY MASS INDEX: 27.47 KG/M2 | WEIGHT: 173 LBS | HEIGHT: 66.5 IN

## 2025-01-24 DIAGNOSIS — M79.644 FINGER PAIN, RIGHT: Primary | ICD-10-CM

## 2025-01-24 DIAGNOSIS — M79.644 FINGER PAIN, RIGHT: ICD-10-CM

## 2025-01-24 PROCEDURE — 99203 OFFICE O/P NEW LOW 30 MIN: CPT | Performed by: ORTHOPAEDIC SURGERY

## 2025-01-24 PROCEDURE — 73140 X-RAY EXAM OF FINGER(S): CPT | Performed by: ORTHOPAEDIC SURGERY

## 2025-01-24 NOTE — H&P
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  Given that she has fairly good range of motion nearly 80 to 90 degrees of flexion and minimal pain would recommend nonsurgical management.  The locking is emanating from the PIP joint secondary to joint surface irregularity.  Other nonsurgical treatment options include turmeric 500 mg twice daily Voltaren gel 1% over-the-counter, CBD oil/cream, corticosteroid injection for flareups.  We discussed 3 surgical options: PIP joint denervation provided she has fairly good PIP joint motion, silicone arthroplasty, and PIP joint fusion.  All have their pros and cons.    Follow Up: As needed    Diagnostic Studies:     XR Right ring finger 3 views: Advanced degenerative changes of the right ring finger PIP joint       Physical Exam:     Ht 5' 6.5\" (1.689 m)   Wt 173 lb (78.5 kg)   BMI 27.50 kg/m²     Constitutional: NAD. AOx3. Well-developed and Well-nourished.   Psychiatric: Normal mood/ affect/ behavior. Judgment and thought content normal.     Right Upper Extremity:     Inspection    Skin intact. No skin lesions. No obvious mass visualized.  Hypertrophic changes of the right ring finger PIP joint.   Palpation    No tenderness palpation of the A1 pulley.  Mild tenderness around the PIP joint      ROM    PIP motion 0-80  MCP and DIP motion near normal     Neurovascular    Normal sensation in the median, ulnar, and radial nerve distribution. Normal motor function of muscles innervated by median/AIN, ulnar, and radial/PIN nerves.    Normally perfused hand(s).     Special    No catching over the A1 pulley          CC: Right ring finger pain    HPI: This 74 year old RHD female presents with right ring finger PIP joint pain and swelling.  She noticed a pimple-like structure over the dorsal aspect of the PIP joint she recently took a course  of prednisone which improved her PIP joint pain and the pimple/cyst resolved.  She does note locking emanating from the PIP joint.    Occupation: Pharmacy tech-retired    History/Other:   Past Medical History:    COPD (chronic obstructive pulmonary disease) with chronic bronchitis (HCC)     Past Surgical History:   Procedure Laterality Date          x3    Tonsillectomy       Current Outpatient Medications   Medication Sig Dispense Refill    INCRUSE ELLIPTA 62.5 MCG/ACT Inhalation Aerosol Powder, Breath Activated INHALE ONE PUFF BY MOUTH EVERY MORNING 90 each 0    hydroCHLOROthiazide 12.5 MG Oral Tab Take 1 tablet (12.5 mg total) by mouth daily. 90 tablet 0    ipratropium-albuterol 0.5-2.5 (3) MG/3ML Inhalation Solution INHALE ONE VIAL in nebulizer EVERY 4 HOURS AS NEEDED FOR COUGH/WHEEZING 270 mL 0    Budesonide-Formoterol Fumarate 160-4.5 MCG/ACT Inhalation Aerosol Inhale 2 puffs into the lungs 2 (two) times daily. 30.6 g 3    VENTOLIN  (90 Base) MCG/ACT Inhalation Aero Soln Inhale 2 puffs into the lungs every 6 (six) hours as needed for Wheezing. 18 g 0    ACYCLOVIR 5 % External Ointment Apply 1 Application. topically every 3 (three) hours as needed. 15 g 0    brimonidine 0.2 % Ophthalmic Solution brimonidine 0.2 % eye drops   PLACE 1 DROP IN BOTH EYES TWICE DAILY      latanoprost 0.005 % Ophthalmic Solution Place 2 drops into both eyes nightly.  1    Vitamin C 500 MG Oral Tab Take 1 tablet (500 mg total) by mouth every morning.      Calcium Citrate-Vitamin D (CITRACAL + D OR) Take 1 tablet by mouth daily.      Cholecalciferol (VITAMIN D) 1000 UNITS Oral Cap Take 5,000 Units by mouth.        predniSONE 10 MG Oral Tab 4 tabs (40 mg) on days 1-3, 3 tabs on days 4-6, 2 tabs on days 7-9, 1 tab on day 10-12, then stop 30 tablet 0     Allergies[1]  Family History   Problem Relation Age of Onset    Hypertension Father     Hypertension Mother     Other (Other) Mother         asthma/transient thyroid issue      Social History     Occupational History    Not on file   Tobacco Use    Smoking status: Former     Current packs/day: 0.00     Average packs/day: 1 pack/day for 20.0 years (20.0 ttl pk-yrs)     Types: Cigarettes     Start date: 1993     Quit date: 2013     Years since quittin.0    Smokeless tobacco: Never   Vaping Use    Vaping status: Never Used   Substance and Sexual Activity    Alcohol use: No     Alcohol/week: 0.0 standard drinks of alcohol    Drug use: No    Sexual activity: Not on file          Review of Systems (negative unless bolded):  General: fevers, chills, fatigue  CV:  chest pain, palpitations, leg swelling  Msk: bodyaches, neck pain, neck stiffness  Skin: rashes, open wounds, nonhealing ulcers  Hem: bleeds easily, bruise easily, immunocompromised  Neuro: dizziness, light headedness, headaches  Psych: anxious, depressed, anger issues      Joe Peters MD   Hand, Wrist, & Elbow Surgery  [email or web address]  t: [phone]  f: [phone]       [1]   Allergies  Allergen Reactions    Doxycycline NAUSEA AND VOMITING    Penicillins DIARRHEA

## 2025-02-18 ENCOUNTER — LAB ENCOUNTER (OUTPATIENT)
Dept: LAB | Age: 75
End: 2025-02-18
Attending: INTERNAL MEDICINE
Payer: MEDICARE

## 2025-02-18 DIAGNOSIS — J44.89 ASTHMA-COPD OVERLAP SYNDROME (HCC): Primary | ICD-10-CM

## 2025-02-18 LAB — EOS CT CALC: 710 MM3 (ref 0–300)

## 2025-02-18 PROCEDURE — 85048 AUTOMATED LEUKOCYTE COUNT: CPT

## 2025-02-18 PROCEDURE — 36415 COLL VENOUS BLD VENIPUNCTURE: CPT

## 2025-03-07 RX ORDER — HYDROCHLOROTHIAZIDE 12.5 MG/1
12.5 TABLET ORAL DAILY
Qty: 90 TABLET | Refills: 0 | Status: SHIPPED | OUTPATIENT
Start: 2025-03-07

## 2025-03-07 NOTE — TELEPHONE ENCOUNTER
Hypertension Medications Protocol Passed03/07/2025 09:08 AM   Protocol Details CMP or BMP in past 12 months    Last BP reading less than 140/90    In person appointment or virtual visit in the past 12 mos or appointment in next 3 mos    EGFRCR or GFRNAA > 50    Medication is active on med list

## 2025-04-04 RX ORDER — ALBUTEROL SULFATE 90 UG/1
2 AEROSOL, METERED RESPIRATORY (INHALATION) EVERY 6 HOURS PRN
Qty: 18 G | Refills: 5 | Status: SHIPPED | OUTPATIENT
Start: 2025-04-04

## 2025-06-05 RX ORDER — HYDROCHLOROTHIAZIDE 12.5 MG/1
12.5 TABLET ORAL DAILY
Qty: 90 TABLET | Refills: 0 | Status: SHIPPED | OUTPATIENT
Start: 2025-06-05

## 2025-06-05 NOTE — TELEPHONE ENCOUNTER
Hypertension Medications Protocol Zezqel1106/05/2025 09:27 AM   Protocol Details CMP or BMP in past 12 months    Last BP reading less than 140/90    In person appointment or virtual visit in the past 12 mos or appointment in next 3 mos    EGFRCR or GFRNAA > 50    Medication is active on med list

## 2025-06-27 DIAGNOSIS — J44.9 CHRONIC OBSTRUCTIVE PULMONARY DISEASE, UNSPECIFIED COPD TYPE (HCC): ICD-10-CM

## 2025-06-27 RX ORDER — IPRATROPIUM BROMIDE AND ALBUTEROL SULFATE 2.5; .5 MG/3ML; MG/3ML
SOLUTION RESPIRATORY (INHALATION)
Qty: 270 ML | Refills: 0 | Status: SHIPPED | OUTPATIENT
Start: 2025-06-27

## 2025-06-28 ENCOUNTER — OFFICE VISIT (OUTPATIENT)
Dept: FAMILY MEDICINE CLINIC | Facility: CLINIC | Age: 75
End: 2025-06-28
Payer: MEDICARE

## 2025-06-28 VITALS
DIASTOLIC BLOOD PRESSURE: 80 MMHG | OXYGEN SATURATION: 96 % | SYSTOLIC BLOOD PRESSURE: 132 MMHG | WEIGHT: 168.63 LBS | HEART RATE: 95 BPM | BODY MASS INDEX: 27 KG/M2 | TEMPERATURE: 98 F

## 2025-06-28 DIAGNOSIS — J45.41 MODERATE PERSISTENT ASTHMA WITH ACUTE EXACERBATION (HCC): Primary | ICD-10-CM

## 2025-06-28 PROCEDURE — 99214 OFFICE O/P EST MOD 30 MIN: CPT | Performed by: FAMILY MEDICINE

## 2025-06-28 RX ORDER — FLUTICASONE FUROATE, UMECLIDINIUM BROMIDE AND VILANTEROL TRIFENATATE 200; 62.5; 25 UG/1; UG/1; UG/1
1 POWDER RESPIRATORY (INHALATION) DAILY
COMMUNITY
Start: 2025-05-16

## 2025-06-28 RX ORDER — MELOXICAM 15 MG/1
TABLET ORAL
COMMUNITY
Start: 2025-06-20

## 2025-06-28 RX ORDER — PREDNISONE 20 MG/1
40 TABLET ORAL DAILY
Qty: 10 TABLET | Refills: 0 | Status: SHIPPED | OUTPATIENT
Start: 2025-06-28 | End: 2025-07-03

## 2025-06-28 NOTE — PROGRESS NOTES
Xi Galvin is a 75 year old female.  Chief Complaint   Patient presents with    Sinus Problem       HPI:   Had been fighting something off.   Thursday started with scratching PND throat, then it went down into chest, ears feel plugged. Doesn't feel like doing anything.   Getting a little tight. Pulse ox before her breathing treatment this AM was 90%. After it was 94%.   Started nebs a couple of days ago, that has helped with the cough. Needs another one every 4-6 hours.   Still SOB this AM despite doing nebs for 48 hrs.   No fevers.   Coughed up plegm, yellowy green, this AM. No blood.     Overall keatonegnixon is working better than her other 2 inhalers. Seeing pulm. Was doing okay 2 weeks ago when she saw pulm.     ALLERGIES:  Allergies   Allergen Reactions    Doxycycline NAUSEA AND VOMITING    Penicillins DIARRHEA         Current Outpatient Medications   Medication Sig Dispense Refill    TRELEGY ELLIPTA 200-62.5-25 MCG/ACT Inhalation Aerosol Powder, Breath Activated Inhale 1 puff into the lungs daily.      Meloxicam 15 MG Oral Tab TAKE 1 TABLET ONCE EVERY DAY BY ORAL ROUTE WITH A MEAL FOR 30 DAYS.      predniSONE 20 MG Oral Tab Take 2 tablets (40 mg total) by mouth daily for 5 days. 10 tablet 0    ipratropium-albuterol 0.5-2.5 (3) MG/3ML Inhalation Solution INHALE ONE VIAL VIA NEBULIZER EVERY 4 HOURS AS NEEDED FOR COUGH/WHEEZING. 270 mL 0    HYDROCHLOROTHIAZIDE 12.5 MG Oral Tab TAKE 1 TABLET BY MOUTH EVERY DAY 90 tablet 0    albuterol (VENTOLIN HFA) 108 (90 Base) MCG/ACT Inhalation Aero Soln INHALE TWO PUFFS BY MOUTH EVERY SIX HOURS AS NEEDED FOR WHEEZING 18 g 5    ACYCLOVIR 5 % External Ointment Apply 1 Application. topically every 3 (three) hours as needed. 15 g 0    brimonidine 0.2 % Ophthalmic Solution brimonidine 0.2 % eye drops   PLACE 1 DROP IN BOTH EYES TWICE DAILY      latanoprost 0.005 % Ophthalmic Solution Place 2 drops into both eyes nightly.  1    Vitamin C 500 MG Oral Tab Take 1 tablet (500 mg  total) by mouth every morning.      Calcium Citrate-Vitamin D (CITRACAL + D OR) Take 1 tablet by mouth daily.      Cholecalciferol (VITAMIN D) 1000 UNITS Oral Cap Take 5,000 Units by mouth.        INCRUSE ELLIPTA 62.5 MCG/ACT Inhalation Aerosol Powder, Breath Activated INHALE ONE PUFF BY MOUTH EVERY MORNING 90 each 0    predniSONE 10 MG Oral Tab 4 tabs (40 mg) on days 1-3, 3 tabs on days 4-6, 2 tabs on days 7-9, 1 tab on day 10-12, then stop 30 tablet 0    Budesonide-Formoterol Fumarate 160-4.5 MCG/ACT Inhalation Aerosol Inhale 2 puffs into the lungs 2 (two) times daily. 30.6 g 3      Past Medical History:    COPD (chronic obstructive pulmonary disease) with chronic bronchitis (HCC)      Social History:  Social History     Socioeconomic History    Marital status:    Tobacco Use    Smoking status: Former     Current packs/day: 0.00     Average packs/day: 1 pack/day for 20.0 years (20.0 ttl pk-yrs)     Types: Cigarettes     Start date: 1993     Quit date: 2013     Years since quittin.5    Smokeless tobacco: Never   Vaping Use    Vaping status: Never Used   Substance and Sexual Activity    Alcohol use: No     Alcohol/week: 0.0 standard drinks of alcohol    Drug use: No     Social Drivers of Health     Food Insecurity: Not At Risk (2023)    Received from [facility]    Food Insecurity     RETIRE How often in the past 12 months would you say you are worried or stressed about having enough money to buy nutritious meals? : Never   Transportation Needs: No Transportation Needs (2023)    Received from [facility]    PRAPARE - Transportation     In the past 12 months, has lack of transportation kept you from medical appointments or from getting medications?: No     In the past 12 months, has lack of transportation kept you from meetings, work, or from getting things needed for daily living?: No    Received from [facility]    Housing Stability         BP Readings from Last 6 Encounters:   06/28/25 132/80   12/02/24 136/82   11/18/24 130/70   10/03/24 (!) 138/92   01/10/24 126/70   10/23/23 134/82       Wt Readings from Last 6 Encounters:   06/28/25 168 lb 9.6 oz (76.5 kg)   01/24/25 173 lb (78.5 kg)   12/02/24 173 lb (78.5 kg)   11/18/24 170 lb 9.6 oz (77.4 kg)   10/03/24 168 lb (76.2 kg)   01/29/24 163 lb (73.9 kg)       REVIEW OF SYSTEMS:   GENERAL HEALTH: feels well no complaints other than above   SKIN: denies any unusual skin lesions or rashes  RESPIRATORY: denies shortness of breath with exertion  CARDIOVASCULAR: denies chest pain on exertion  GI: denies abdominal pain and denies heartburn  NEURO: denies headaches    EXAM:   /80   Pulse 95   Temp 97.6 °F (36.4 °C) (Temporal)   Wt 168 lb 9.6 oz (76.5 kg)   SpO2 96%   BMI 26.81 kg/m²  Body mass index is 26.81 kg/m².      GENERAL: well developed, well nourished,in no apparent distress  SKIN: no rashes,no suspicious lesions  HEENT: atraumatic, normocephalic,ears and throat are clear, no sinus tenderness   NECK: supple,no adenopathy   LUNGS: clear to auscultation, decreased air movement, no rales or wheezing.   CARDIO: RRR without murmur  GI: good BS's,no masses, HSM or tenderness  EXTREMITIES: no cyanosis, clubbing or edema    ASSESSMENT AND PLAN:     Encounter Diagnosis   Name Primary?    Moderate persistent asthma with acute exacerbation (HCC) Yes       Diagnoses and all orders for this visit:    Moderate persistent asthma with acute exacerbation (HCC)  -     predniSONE 20 MG Oral Tab; Take 2 tablets (40 mg total) by mouth daily for 5 days.    Start prednisone.   Call next week with update, if getting worse, more phlegm or sinus/ear pain, would send in abx.   Continue nebs as needed.     No orders of the defined types were placed in this encounter.              Meds & Refills for this Visit:  Requested Prescriptions     Signed Prescriptions Disp Refills    predniSONE 20 MG Oral Tab 10 tablet 0      Sig: Take 2 tablets (40 mg total) by mouth daily for 5 days.             The patient indicates understanding of these issues and agrees to the plan.

## 2025-07-01 ENCOUNTER — TELEPHONE (OUTPATIENT)
Dept: FAMILY MEDICINE CLINIC | Facility: CLINIC | Age: 75
End: 2025-07-01

## 2025-07-01 DIAGNOSIS — J01.00 ACUTE NON-RECURRENT MAXILLARY SINUSITIS: Primary | ICD-10-CM

## 2025-07-01 RX ORDER — AZITHROMYCIN 250 MG/1
TABLET, FILM COATED ORAL
Qty: 6 TABLET | Refills: 0 | Status: SHIPPED | OUTPATIENT
Start: 2025-07-01 | End: 2025-07-06

## 2025-07-01 NOTE — TELEPHONE ENCOUNTER
I sent in azithromycin.   Keep up with prednisone, do neb every 4-6 hours scheduled for a few day and wean down when starting to feel better.     If not better, or getting worse off of prednisone, then let me know.

## 2025-07-01 NOTE — TELEPHONE ENCOUNTER
Patient calling, states she is on day 4 of prednisone. States she is still experiencing a  tightness in upper lung area, breathing is okay but head still feels \"stuffy\".   Coughing in the morning but seems to go down after breathing treatment.     Endorsed to RN.

## 2025-07-07 ENCOUNTER — TELEPHONE (OUTPATIENT)
Dept: FAMILY MEDICINE CLINIC | Facility: CLINIC | Age: 75
End: 2025-07-07

## 2025-07-07 NOTE — TELEPHONE ENCOUNTER
See previous encounters    Patient states that she saw improvement after the first dose of prednisone but then declined again    She feels she has had no improvement with prednisone or z-pac    Please adv  Thank you

## 2025-07-07 NOTE — TELEPHONE ENCOUNTER
I think she should call her pulmnologist.      Otherwise, I can see her again in office and listen to lungs again. We can do XR if needed.     If feeling really bad, then go to ER.    Pt just seen in office today, was given #30 will 11 refills.

## 2025-07-07 NOTE — TELEPHONE ENCOUNTER
Patient notified and verbalized understanding.   States she is coughing a little bit, has had a few quick episodes of dizziness/feeling off. Does not feel she needs ER.    States she will call pulm    Advised if wants to be seen call office back.

## (undated) DEVICE — DRAPE REINFORCE SPLIT ABS TUBE HLDR UNV 120X77IN SURG CNVRT

## (undated) DEVICE — BIT DRILL 5MM 300MM CANNULATED LG QC SS NS REUSE REPRO

## (undated) DEVICE — DRAPE PK PLATE PRTC FTSWTCH XRY TUBE EQUIPMENT TIDI STRL

## (undated) DEVICE — HOOD SRG T7PLUS PEEL AWAY FACE SHLD STRL LF DISP

## (undated) DEVICE — DRAPE 2 INCS FILM ANTIMICROBIAL 23X17IN SURG IOBAN STRL

## (undated) DEVICE — DRAPE ADH 51X47IN SURG STRDRP STRL LF DISP CLR

## (undated) DEVICE — GLOVE SURG 7 PROTEXIS LF CRM PF BEAD CUFF STRL PLISPRN 12IN

## (undated) DEVICE — CUFF TRNQT 24X4IN ATS CYL 2 PORT BLDR STRL LF DISP

## (undated) DEVICE — BIT DRILL 2.5MM 180MM QC SS NS GOLD

## (undated) DEVICE — SYRINGE 30ML CONC TIP GRAD N-PYRG DEHP-FR STRL MED LF DISP

## (undated) DEVICE — GLOVE SURG 8.5 PROTEXIS ESTM LF CRM PF SMTH BEAD CUFF INTLK

## (undated) DEVICE — SUTURE VICRYL 1 CT1 27IN BRAID COAT ABS UNDYED J261H

## (undated) DEVICE — SYRINGE 50ML GRAD N-PYRG DEHP-FR PVC FREE STRL MED LF DISP

## (undated) DEVICE — TOWEL OR BLU 16X26IN 4PK

## (undated) DEVICE — HOOD SRG FLYTE SURGICOOL PEELAWAY STRL LF DISP

## (undated) DEVICE — NEEDLE HPO 22GA 1.5IN BVL ORT SELF LVL SHTH SFSHLD STRL LF

## (undated) DEVICE — WRAP CMPR 5YDX6IN COBAN POR SLFADH ELASTIC LTWT HAND TEAR LF

## (undated) DEVICE — STAPLER SKIN 3.9X6.9MM WIDE RECT 35 CNT ROTATE HEAD RCHT

## (undated) DEVICE — GLOVE SURG 6.5 PROTEXIS LF CRM PF BEAD CUFF STRL PLISPRN

## (undated) DEVICE — SYRINGE 20ML GRAD STRL MED DISP LL

## (undated) DEVICE — DRESSING TRANS 4.75X4IN ADH HPOAL WTPRF TEGADERM PU STD STRL

## (undated) DEVICE — Device

## (undated) DEVICE — BANDAGE CMPR VELCLOSE 5YDX4IN ELASTIC VELCRO POLY YARN LYCRA

## (undated) DEVICE — HANDPIECE SCT MDVC FLX-CLR HI CAPACITY FLXB CLR STRL LF DISP

## (undated) DEVICE — BANDAGE CMPR VELCLOSE 5YDX6IN HOOK SLFCLSR KNIT ELASTIC MED

## (undated) DEVICE — GUIDEWIRE 2.8MM 300MM ORTHO CLBRT FLUTE DRILL PT

## (undated) DEVICE — PILLOW PSTN 7IN GENTLETOUCH HEAD CONTOURETHANE

## (undated) DEVICE — GLOVE SURG 8 PROTEXIS LF CRM PF BEAD CUFF STRL PLISPRN 12IN

## (undated) DEVICE — GOWN SURG LG L3 NONREINFORCE SET IN SLV STRL LF DISP BLUE

## (undated) DEVICE — GLOVE SURG 7 PROTEXIS LF BLUE PF SMTH BEAD CUFF INTLK STRL

## (undated) DEVICE — DRAPE .75 SHT FNFLD 76X52IN SURG CNVRT STRL LF DISP TIBURON

## (undated) DEVICE — DRESSING PETRO 8X1IN NADH OCL BCTRST LOWPRFL XEROFORM 3% BI

## (undated) DEVICE — GLOVE RESIST SPEC-TEC-CUT

## (undated) DEVICE — SUTURE ETHILON 2-0 FS 18IN MONO NABSB BLK 664H

## (undated) DEVICE — SUTURE ETHIBOND EXCEL 2 5-37 30IN BRAID 4 STRN NABSB GRN MX69G

## (undated) DEVICE — ELECTRODE PT RTN C30- LB 9FT CORD NONIRRITATE NONSENSITIZE

## (undated) DEVICE — POUCH INST 11X7IN 2 ADH STRIP 2 CMPRT STRL STRDRP PLASTIC

## (undated) DEVICE — SUTURE VICRYL 2-0 CT2 27IN BRAID COAT ABS UNDYED J269H

## (undated) DEVICE — SPONGE GAUZE 4X4IN CTN 16 PLY WOVEN FOLD EDGE STRL LF

## (undated) NOTE — LETTER
11/20/24        Xi Galvin   [address] [facility] [address]           Dear Xi Galvin     Our records indicate that you have outstanding lab work and or testing that was ordered for you and has not yet been completed:  Lab Frequency Next Occurrence   Occult Blood, Fecal, Immunoassay (Blue cards) [E] Once 10/03/2024      To provide you with the best possible care, please complete these orders at your earliest convenience. If you have recently completed these orders please disregard this letter.     If you have any questions please call the office at [phone].     Thank you,     [facility]

## (undated) NOTE — LETTER
11/13/20          [address] [place] [address]           Dear Adele Wilcox     Our records indicate that you have outstanding lab work and or testing that was ordered for you and has not yet been completed:  Lab Frequency Next Occu

## (undated) NOTE — MR AVS SNAPSHOT
[address] [facility] [address]  [phone]               Thank you for choosing us for your [facility]   We are glad to serve you and happy to provide you with this Vitamin D 1000 UNITS Caps   Take 5,000 Units by mouth. Where to Get Your Medications      These medications were sent to [address], [address] [address]  [facility], [phone]  [address]  [facility] Cinda Faustin [address]

## (undated) NOTE — MR AVS SNAPSHOT
After Visit Summary   10/12/2020    Fito Curran    MRN: [id number]           Visit Information     Date & Time  10/12/2020 10:30 AM Provider  Charlie Rutledge MD [place] [address], [place] [facility]  Phone  [phone] Encounter for annual health examination   [[id number]]  -  Primary  Visit for screening mammogram   [[id number]]    Screening for colon cancer   [[id number]]    Flu vaccine need   [[id number]]    Osteopenia of multiple sites   [[id number]]    Simple chronic bronchitis   [459 Instructions: Your order will generate a \"Scheduling Ticket\" that will be available in [place] to schedule on your own at a time most convenient to you.   Please note, it will take approximately 2 hours from the time your order was placed by your physicia EKG - covered if needed at [facility], and non-screening if indicated for medical reasons Electrocardiogram date12/06/2017 Routine EKG is not a screening covered service except at the Welcome to Medicare Visit    Abdominal aortic aneurysm screeni Pap and Pelvic      Pap: Every 3 yrs age 21-65 or Pap+HPV every 5 yrs age 33-67, Covered every 2 yrs up to age 79 or Yearly if High Risk   Pap Smear,5 Years due on 12/09/2019     Chlamydia  Annually if high risk No results found for: CHLAMYDIA No flowsheet An information packet, including necessary form from the [employer] website. http://www. idph.state. [place]/public/books/advin.htm  A link to the [employer].  This site has a lot of good information including definit Don’t forget strength training with weights and resistance Set goals and track your progress   You don’t need to join a gym. Home exercises work great.  Put more priority on exercise in your life           DM now offers Video Visits through [address] for a Average cost  $70*       [facility] – [address]   Monday – Friday  10:00 am – 10:00 pm   Saturday – Sunday  10:00 am – 4:00 pm     [address]   Monday – Friday  4:00 pm – 10:00 pm   [place]

## (undated) NOTE — LETTER
10/30/19        [address] [facility] [address] [facility]      Dear Enedina Morgan,    Our records indicate that you have outstanding lab work and or testing that was ordered for you and has not yet been completed:  Lab Frequency Next Occurrence   XR DEXA KRISTIN